# Patient Record
Sex: FEMALE | Race: WHITE | NOT HISPANIC OR LATINO | Employment: FULL TIME
[De-identification: names, ages, dates, MRNs, and addresses within clinical notes are randomized per-mention and may not be internally consistent; named-entity substitution may affect disease eponyms.]

---

## 2017-10-29 ENCOUNTER — HEALTH MAINTENANCE LETTER (OUTPATIENT)
Age: 22
End: 2017-10-29

## 2021-05-25 ENCOUNTER — RECORDS - HEALTHEAST (OUTPATIENT)
Dept: ADMINISTRATIVE | Facility: CLINIC | Age: 26
End: 2021-05-25

## 2022-11-14 ENCOUNTER — HOSPITAL ENCOUNTER (EMERGENCY)
Facility: CLINIC | Age: 27
Discharge: HOME | End: 2022-11-15
Attending: EMERGENCY MEDICINE | Admitting: EMERGENCY MEDICINE
Payer: COMMERCIAL

## 2022-11-14 DIAGNOSIS — R19.7 NAUSEA VOMITING AND DIARRHEA: ICD-10-CM

## 2022-11-14 DIAGNOSIS — R11.2 NAUSEA VOMITING AND DIARRHEA: ICD-10-CM

## 2022-11-14 LAB
ALBUMIN SERPL BCG-MCNC: 4.7 G/DL (ref 3.5–5.2)
ALBUMIN UR-MCNC: ABNORMAL MG/DL
ALP SERPL-CCNC: 60 U/L (ref 35–104)
ALT SERPL W P-5'-P-CCNC: 21 U/L (ref 10–35)
ANION GAP SERPL CALCULATED.3IONS-SCNC: 11 MMOL/L (ref 7–15)
APPEARANCE UR: CLEAR
AST SERPL W P-5'-P-CCNC: 28 U/L (ref 10–35)
BACTERIA #/AREA URNS HPF: ABNORMAL /HPF
BASOPHILS # BLD AUTO: 0 10E3/UL (ref 0–0.2)
BASOPHILS NFR BLD AUTO: 0 %
BILIRUB SERPL-MCNC: 0.7 MG/DL
BILIRUB UR QL STRIP: ABNORMAL
BUN SERPL-MCNC: 13.7 MG/DL (ref 6–20)
CALCIUM SERPL-MCNC: 9.7 MG/DL (ref 8.6–10)
CHLORIDE SERPL-SCNC: 103 MMOL/L (ref 98–107)
COLOR UR AUTO: YELLOW
CREAT SERPL-MCNC: 0.69 MG/DL (ref 0.51–0.95)
DEPRECATED HCO3 PLAS-SCNC: 25 MMOL/L (ref 22–29)
EOSINOPHIL # BLD AUTO: 0 10E3/UL (ref 0–0.7)
EOSINOPHIL NFR BLD AUTO: 1 %
ERYTHROCYTE [DISTWIDTH] IN BLOOD BY AUTOMATED COUNT: 11.8 % (ref 10–15)
GFR SERPL CREATININE-BSD FRML MDRD: >90 ML/MIN/1.73M2
GLUCOSE SERPL-MCNC: 121 MG/DL (ref 70–99)
GLUCOSE UR STRIP-MCNC: NEGATIVE MG/DL
HCG SERPL QL: NEGATIVE
HCT VFR BLD AUTO: 44.4 % (ref 35–47)
HGB BLD-MCNC: 15.1 G/DL (ref 11.7–15.7)
HGB UR QL STRIP: NEGATIVE
HOLD SPECIMEN: NORMAL
HOLD SPECIMEN: NORMAL
IMM GRANULOCYTES # BLD: 0 10E3/UL
IMM GRANULOCYTES NFR BLD: 0 %
KETONES UR STRIP-MCNC: 160 MG/DL
LEUKOCYTE ESTERASE UR QL STRIP: NEGATIVE
LIPASE SERPL-CCNC: 29 U/L (ref 13–60)
LYMPHOCYTES # BLD AUTO: 0.5 10E3/UL (ref 0.8–5.3)
LYMPHOCYTES NFR BLD AUTO: 6 %
MCH RBC QN AUTO: 30.6 PG (ref 26.5–33)
MCHC RBC AUTO-ENTMCNC: 34 G/DL (ref 31.5–36.5)
MCV RBC AUTO: 90 FL (ref 78–100)
MONOCYTES # BLD AUTO: 0.4 10E3/UL (ref 0–1.3)
MONOCYTES NFR BLD AUTO: 5 %
MUCOUS THREADS #/AREA URNS LPF: PRESENT /LPF
NEUTROPHILS # BLD AUTO: 7.6 10E3/UL (ref 1.6–8.3)
NEUTROPHILS NFR BLD AUTO: 88 %
NITRATE UR QL: NEGATIVE
NRBC # BLD AUTO: 0 10E3/UL
NRBC BLD AUTO-RTO: 0 /100
PH UR STRIP: 6 [PH] (ref 5–7)
PLATELET # BLD AUTO: 268 10E3/UL (ref 150–450)
POTASSIUM SERPL-SCNC: 4.2 MMOL/L (ref 3.4–5.3)
PROT SERPL-MCNC: 7.3 G/DL (ref 6.4–8.3)
RBC # BLD AUTO: 4.94 10E6/UL (ref 3.8–5.2)
RBC URINE: 2 /HPF
SODIUM SERPL-SCNC: 139 MMOL/L (ref 136–145)
SP GR UR STRIP: 1.03 (ref 1–1.03)
SQUAMOUS EPITHELIAL: 2 /HPF
UROBILINOGEN UR STRIP-MCNC: NORMAL MG/DL
WBC # BLD AUTO: 8.6 10E3/UL (ref 4–11)
WBC URINE: 4 /HPF

## 2022-11-14 PROCEDURE — 99284 EMERGENCY DEPT VISIT MOD MDM: CPT | Mod: 25 | Performed by: EMERGENCY MEDICINE

## 2022-11-14 PROCEDURE — 36415 COLL VENOUS BLD VENIPUNCTURE: CPT | Performed by: EMERGENCY MEDICINE

## 2022-11-14 PROCEDURE — 80053 COMPREHEN METABOLIC PANEL: CPT | Performed by: EMERGENCY MEDICINE

## 2022-11-14 PROCEDURE — 99284 EMERGENCY DEPT VISIT MOD MDM: CPT | Performed by: EMERGENCY MEDICINE

## 2022-11-14 PROCEDURE — 84703 CHORIONIC GONADOTROPIN ASSAY: CPT | Performed by: EMERGENCY MEDICINE

## 2022-11-14 PROCEDURE — 83690 ASSAY OF LIPASE: CPT | Performed by: EMERGENCY MEDICINE

## 2022-11-14 PROCEDURE — 85025 COMPLETE CBC W/AUTO DIFF WBC: CPT | Performed by: EMERGENCY MEDICINE

## 2022-11-14 PROCEDURE — 258N000003 HC RX IP 258 OP 636: Performed by: EMERGENCY MEDICINE

## 2022-11-14 PROCEDURE — 96361 HYDRATE IV INFUSION ADD-ON: CPT | Performed by: EMERGENCY MEDICINE

## 2022-11-14 PROCEDURE — 250N000011 HC RX IP 250 OP 636: Performed by: EMERGENCY MEDICINE

## 2022-11-14 PROCEDURE — 81001 URINALYSIS AUTO W/SCOPE: CPT | Performed by: EMERGENCY MEDICINE

## 2022-11-14 PROCEDURE — 96374 THER/PROPH/DIAG INJ IV PUSH: CPT | Performed by: EMERGENCY MEDICINE

## 2022-11-14 PROCEDURE — 82040 ASSAY OF SERUM ALBUMIN: CPT | Performed by: EMERGENCY MEDICINE

## 2022-11-14 RX ORDER — ONDANSETRON 2 MG/ML
4 INJECTION INTRAMUSCULAR; INTRAVENOUS ONCE
Status: COMPLETED | OUTPATIENT
Start: 2022-11-14 | End: 2022-11-14

## 2022-11-14 RX ADMIN — SODIUM CHLORIDE 1000 ML: 9 INJECTION, SOLUTION INTRAVENOUS at 20:56

## 2022-11-14 RX ADMIN — ONDANSETRON 4 MG: 2 INJECTION INTRAMUSCULAR; INTRAVENOUS at 20:59

## 2022-11-14 ASSESSMENT — ACTIVITIES OF DAILY LIVING (ADL)
ADLS_ACUITY_SCORE: 35
ADLS_ACUITY_SCORE: 35

## 2022-11-15 VITALS
SYSTOLIC BLOOD PRESSURE: 126 MMHG | HEART RATE: 105 BPM | TEMPERATURE: 99.8 F | OXYGEN SATURATION: 98 % | RESPIRATION RATE: 18 BRPM | DIASTOLIC BLOOD PRESSURE: 83 MMHG

## 2022-11-15 RX ORDER — ONDANSETRON 4 MG/1
4 TABLET, ORALLY DISINTEGRATING ORAL EVERY 8 HOURS PRN
Qty: 10 TABLET | Refills: 0 | Status: SHIPPED | OUTPATIENT
Start: 2022-11-15 | End: 2024-05-06

## 2022-11-15 ASSESSMENT — ACTIVITIES OF DAILY LIVING (ADL): ADLS_ACUITY_SCORE: 35

## 2022-11-15 NOTE — ED PROVIDER NOTES
"  History     Chief Complaint   Patient presents with     Abdominal Pain     \"Severe\" abdominal pain, unable to keep water down for 24 hours, \"violently\" shaking and patient states she is starting to become \"foggy in the head.\" Patient reports that emesis is dark brown colored.     Vomiting     HPI  Melissa Beasley is a 27 year old female with past medical history significant for anxiety disorder who presents emergency department complaining of nausea vomiting and diarrhea.  Patient states she ate a [place] this morning and had a breakfast sandwich and a iced coffee.  Patient states couple hours after this she began throwing up and has been having cramping abdominal pain throughout the day along with continued vomiting and some loose watery stools.  At times the cramping was severe that she was shaking.  She denies any fevers or chills has not had any headache or visual changes.  She denies any chest pain or shortness of breath.  Abdominal pain currently is a 3 out of 10 and was previously an 8 out of 10.  Patient says her vomit is dark but thinks it is bilious in nature.  No focal numbness weakness in extremities she has not had headache or visual changes denies any fever she was doing well until yesterday and is concerned she might have food poisoning.  Allergies:  Allergies   Allergen Reactions     Seasonal Allergies        Problem List:    Patient Active Problem List    Diagnosis Date Noted     Generalized anxiety disorder 05/26/2011     Priority: Medium     Referred for psychotherapy  Diagnosis updated by automated process. Provider to review and confirm.          Past Medical History:    Past Medical History:   Diagnosis Date     Allergies        Past Surgical History:    Past Surgical History:   Procedure Laterality Date     DENTAL SURGERY      wisdom teeth     MYRINGOTOMY, INSERT TUBE BILATERAL, COMBINED       TONSILLECTOMY         Family History:    No family history on file.    Social History:  Marital " Status:  Single [1]  Social History     Tobacco Use     Smoking status: Never     Smokeless tobacco: Never   Substance Use Topics     Alcohol use: No     Drug use: No        Medications:    norethindrone-ethinyl estradiol (GILDESS FE 1/20) 1-20 MG-MCG per tablet  sertraline (ZOLOFT) 50 MG tablet  sertraline (ZOLOFT) 50 MG tablet          Review of Systems  All systems reviewed and other than pertinent positives and negatives in HPI all other systems are negative.  Physical Exam   BP: 120/86  Pulse: (!) 133  Temp: 99.8  F (37.7  C)  Resp: 18  SpO2: 97 %      Physical Exam  Vitals and nursing note reviewed.   Constitutional:       General: She is not in acute distress.     Appearance: She is well-developed. She is not ill-appearing, toxic-appearing or diaphoretic.   HENT:      Head: Normocephalic and atraumatic.      Mouth/Throat:      Mouth: Mucous membranes are moist.      Pharynx: Oropharynx is clear.   Eyes:      Conjunctiva/sclera: Conjunctivae normal.   Cardiovascular:      Rate and Rhythm: Normal rate and regular rhythm.      Pulses: Normal pulses.      Heart sounds: Normal heart sounds. No murmur heard.  Pulmonary:      Effort: Pulmonary effort is normal.      Breath sounds: Normal breath sounds. No stridor. No wheezing or rhonchi.   Abdominal:      General: Abdomen is flat. There is no distension.      Palpations: Abdomen is soft.      Tenderness: There is no abdominal tenderness. There is no guarding or rebound.      Comments: There is hyperactive bowel sounds present.   Musculoskeletal:         General: No swelling or tenderness. Normal range of motion.      Cervical back: Normal range of motion and neck supple.      Right lower leg: No edema.      Left lower leg: No edema.   Skin:     General: Skin is warm and dry.      Capillary Refill: Capillary refill takes less than 2 seconds.      Findings: No rash.   Neurological:      General: No focal deficit present.      Mental Status: She is alert and oriented  to person, place, and time.      Sensory: No sensory deficit.      Motor: No weakness.      Coordination: Coordination normal.   Psychiatric:         Mood and Affect: Mood normal.         ED Course                 Procedures              Critical Care time:  none               Results for orders placed or performed during the hospital encounter of 11/14/22 (from the past 24 hour(s))   CBC with platelets, differential    Narrative    The following orders were created for panel order CBC with platelets, differential.  Procedure                               Abnormality         Status                     ---------                               -----------         ------                     CBC with platelets and d...[[id number]]  Abnormal            Final result                 Please view results for these tests on the individual orders.   Comprehensive metabolic panel   Result Value Ref Range    Sodium 139 136 - 145 mmol/L    Potassium 4.2 3.4 - 5.3 mmol/L    Chloride 103 98 - 107 mmol/L    Carbon Dioxide (CO2) 25 22 - 29 mmol/L    Anion Gap 11 7 - 15 mmol/L    Urea Nitrogen 13.7 6.0 - 20.0 mg/dL    Creatinine 0.69 0.51 - 0.95 mg/dL    Calcium 9.7 8.6 - 10.0 mg/dL    Glucose 121 (H) 70 - 99 mg/dL    Alkaline Phosphatase 60 35 - 104 U/L    AST 28 10 - 35 U/L    ALT 21 10 - 35 U/L    Protein Total 7.3 6.4 - 8.3 g/dL    Albumin 4.7 3.5 - 5.2 g/dL    Bilirubin Total 0.7 <=1.2 mg/dL    GFR Estimate >90 >60 mL/min/1.73m2   Lipase   Result Value Ref Range    Lipase 29 13 - 60 U/L   [facility] Draw    Narrative    The following orders were created for panel order [facility] Draw.  Procedure                               Abnormality         Status                     ---------                               -----------         ------                     Extra Blue Top Tube[[id number]]                              In process                 Extra Red Top Tube[[id number]]                               In process                   Please  view results for these tests on the individual orders.   CBC with platelets and differential   Result Value Ref Range    WBC Count 8.6 4.0 - 11.0 10e3/uL    RBC Count 4.94 3.80 - 5.20 10e6/uL    Hemoglobin 15.1 11.7 - 15.7 g/dL    Hematocrit 44.4 35.0 - 47.0 %    MCV 90 78 - 100 fL    MCH 30.6 26.5 - 33.0 pg    MCHC 34.0 31.5 - 36.5 g/dL    RDW 11.8 10.0 - 15.0 %    Platelet Count 268 150 - 450 10e3/uL    % Neutrophils 88 %    % Lymphocytes 6 %    % Monocytes 5 %    % Eosinophils 1 %    % Basophils 0 %    % Immature Granulocytes 0 %    NRBCs per 100 WBC 0 <1 /100    Absolute Neutrophils 7.6 1.6 - 8.3 10e3/uL    Absolute Lymphocytes 0.5 (L) 0.8 - 5.3 10e3/uL    Absolute Monocytes 0.4 0.0 - 1.3 10e3/uL    Absolute Eosinophils 0.0 0.0 - 0.7 10e3/uL    Absolute Basophils 0.0 0.0 - 0.2 10e3/uL    Absolute Immature Granulocytes 0.0 <=0.4 10e3/uL    Absolute NRBCs 0.0 10e3/uL       Medications   ondansetron (ZOFRAN) injection 4 mg (4 mg Intravenous Given 11/14/22 2059)   0.9% sodium chloride BOLUS (0 mLs Intravenous Stopped 11/14/22 2148)       Assessments & Plan (with Medical Decision Making) records were reviewed.  Labs were obtained.  Patient was given IV fluids and Zofran.  CBC was without significant abnormality.  Comprehensive metabolic panel with a glucose of 121.  Lipase was 29.  Pregnancy test was negative.  Urine analysis with small bilirubin 160 ketones trace protein.  Pregnancy test was negative.  Patient unable to provide sample at this time.  She was observed for some time and feeling better.  On reexamination she is not having any abdominal pain.  I discussed possible CT scan of the abdomen pelvis with patient but with no pain at this time he decided to hold off on this.  This feels like a viral gastroenteritis daughter a possible foodborne illness.  As an outpatient she will return with a stool sample for assessment for Salmonella or other possibilities.  She will continue to drink plenty of fluids she will  be given Zofran and should return if any fevers abdominal pain blood in stool focal numbness weakness any extremity or other symptoms present.  Patient is agreement this plan.     I have reviewed the nursing notes.    I have reviewed the findings, diagnosis, plan and need for follow up with the patient.       Discharge Medication List as of 11/15/2022 12:56 AM      START taking these medications    Details   ondansetron (ZOFRAN ODT) 4 MG ODT tab Take 1 tablet (4 mg) by mouth every 8 hours as needed for nausea, Disp-10 tablet, R-0, [facility]eds             Final diagnoses:   Nausea vomiting and diarrhea       11/14/2022   [facility] EMERGENCY DEPT     Kristian Palma MD  11/15/22 1447

## 2022-11-15 NOTE — ED NOTES
Patient drank half a cup of water and ate one Saltine cracker. Patient reports that she was feeling a little nauseated after eating the cracker but has been able to keep it down.

## 2022-11-15 NOTE — DISCHARGE INSTRUCTIONS
Return if symptoms worsen or new symptoms develop.  Follow-up with primary care physician next available.  Drink plenty of fluids.  Advance diet as tolerated.  Take Zofran as needed for nausea if increased fevers, return of abdominal pain or other symptoms present including blood in stool please return for recheck.  Bring in a stool sample at your convenience.

## 2023-04-23 ENCOUNTER — HEALTH MAINTENANCE LETTER (OUTPATIENT)
Age: 28
End: 2023-04-23

## 2024-03-24 ENCOUNTER — HOSPITAL ENCOUNTER (EMERGENCY)
Facility: CLINIC | Age: 29
Discharge: HOME | End: 2024-03-24
Attending: FAMILY MEDICINE | Admitting: FAMILY MEDICINE
Payer: COMMERCIAL

## 2024-03-24 ENCOUNTER — APPOINTMENT (OUTPATIENT)
Dept: CT IMAGING | Facility: CLINIC | Age: 29
End: 2024-03-24
Attending: FAMILY MEDICINE
Payer: COMMERCIAL

## 2024-03-24 VITALS
DIASTOLIC BLOOD PRESSURE: 83 MMHG | OXYGEN SATURATION: 96 % | SYSTOLIC BLOOD PRESSURE: 124 MMHG | TEMPERATURE: 97.7 F | HEART RATE: 98 BPM | RESPIRATION RATE: 10 BRPM

## 2024-03-24 DIAGNOSIS — K90.49 FOOD INTOLERANCE: ICD-10-CM

## 2024-03-24 DIAGNOSIS — R07.9 CHEST PAIN, UNSPECIFIED TYPE: ICD-10-CM

## 2024-03-24 LAB
ALBUMIN SERPL BCG-MCNC: 4.4 G/DL (ref 3.5–5.2)
ALP SERPL-CCNC: 45 U/L (ref 40–150)
ALT SERPL W P-5'-P-CCNC: 18 U/L (ref 0–50)
ANION GAP SERPL CALCULATED.3IONS-SCNC: 11 MMOL/L (ref 7–15)
AST SERPL W P-5'-P-CCNC: 13 U/L (ref 0–45)
BASOPHILS # BLD AUTO: 0 10E3/UL (ref 0–0.2)
BASOPHILS NFR BLD AUTO: 1 %
BILIRUB DIRECT SERPL-MCNC: <0.2 MG/DL (ref 0–0.3)
BILIRUB SERPL-MCNC: 0.4 MG/DL
BUN SERPL-MCNC: 9.6 MG/DL (ref 6–20)
CALCIUM SERPL-MCNC: 9.6 MG/DL (ref 8.6–10)
CHLORIDE SERPL-SCNC: 105 MMOL/L (ref 98–107)
CREAT SERPL-MCNC: 0.75 MG/DL (ref 0.51–0.95)
D DIMER PPP FEU-MCNC: 0.53 UG/ML FEU (ref 0–0.5)
DEPRECATED HCO3 PLAS-SCNC: 24 MMOL/L (ref 22–29)
EGFRCR SERPLBLD CKD-EPI 2021: >90 ML/MIN/1.73M2
EOSINOPHIL # BLD AUTO: 0.1 10E3/UL (ref 0–0.7)
EOSINOPHIL NFR BLD AUTO: 2 %
ERYTHROCYTE [DISTWIDTH] IN BLOOD BY AUTOMATED COUNT: 12.3 % (ref 10–15)
GLUCOSE SERPL-MCNC: 101 MG/DL (ref 70–99)
HCT VFR BLD AUTO: 41.1 % (ref 35–47)
HGB BLD-MCNC: 14.6 G/DL (ref 11.7–15.7)
HOLD SPECIMEN: NORMAL
HOLD SPECIMEN: NORMAL
IMM GRANULOCYTES # BLD: 0 10E3/UL
IMM GRANULOCYTES NFR BLD: 0 %
LIPASE SERPL-CCNC: 28 U/L (ref 13–60)
LYMPHOCYTES # BLD AUTO: 2.4 10E3/UL (ref 0.8–5.3)
LYMPHOCYTES NFR BLD AUTO: 43 %
MCH RBC QN AUTO: 31.7 PG (ref 26.5–33)
MCHC RBC AUTO-ENTMCNC: 35.5 G/DL (ref 31.5–36.5)
MCV RBC AUTO: 89 FL (ref 78–100)
MONOCYTES # BLD AUTO: 0.4 10E3/UL (ref 0–1.3)
MONOCYTES NFR BLD AUTO: 8 %
NEUTROPHILS # BLD AUTO: 2.6 10E3/UL (ref 1.6–8.3)
NEUTROPHILS NFR BLD AUTO: 47 %
NRBC # BLD AUTO: 0 10E3/UL
NRBC BLD AUTO-RTO: 0 /100
PLATELET # BLD AUTO: 254 10E3/UL (ref 150–450)
POTASSIUM SERPL-SCNC: 3.7 MMOL/L (ref 3.4–5.3)
PROT SERPL-MCNC: 7.4 G/DL (ref 6.4–8.3)
RBC # BLD AUTO: 4.6 10E6/UL (ref 3.8–5.2)
SODIUM SERPL-SCNC: 140 MMOL/L (ref 135–145)
TROPONIN T SERPL HS-MCNC: <6 NG/L
WBC # BLD AUTO: 5.5 10E3/UL (ref 4–11)

## 2024-03-24 PROCEDURE — 85025 COMPLETE CBC W/AUTO DIFF WBC: CPT | Performed by: FAMILY MEDICINE

## 2024-03-24 PROCEDURE — 82248 BILIRUBIN DIRECT: CPT | Performed by: FAMILY MEDICINE

## 2024-03-24 PROCEDURE — 84484 ASSAY OF TROPONIN QUANT: CPT | Performed by: FAMILY MEDICINE

## 2024-03-24 PROCEDURE — 93010 ELECTROCARDIOGRAM REPORT: CPT | Performed by: FAMILY MEDICINE

## 2024-03-24 PROCEDURE — 83690 ASSAY OF LIPASE: CPT | Performed by: FAMILY MEDICINE

## 2024-03-24 PROCEDURE — 80048 BASIC METABOLIC PNL TOTAL CA: CPT | Performed by: FAMILY MEDICINE

## 2024-03-24 PROCEDURE — 71275 CT ANGIOGRAPHY CHEST: CPT

## 2024-03-24 PROCEDURE — 93005 ELECTROCARDIOGRAM TRACING: CPT | Performed by: FAMILY MEDICINE

## 2024-03-24 PROCEDURE — 250N000011 HC RX IP 250 OP 636: Performed by: FAMILY MEDICINE

## 2024-03-24 PROCEDURE — 36415 COLL VENOUS BLD VENIPUNCTURE: CPT | Performed by: EMERGENCY MEDICINE

## 2024-03-24 PROCEDURE — 99284 EMERGENCY DEPT VISIT MOD MDM: CPT | Mod: 25 | Performed by: FAMILY MEDICINE

## 2024-03-24 PROCEDURE — 250N000009 HC RX 250: Performed by: FAMILY MEDICINE

## 2024-03-24 PROCEDURE — 85025 COMPLETE CBC W/AUTO DIFF WBC: CPT | Performed by: EMERGENCY MEDICINE

## 2024-03-24 PROCEDURE — 80053 COMPREHEN METABOLIC PANEL: CPT | Performed by: FAMILY MEDICINE

## 2024-03-24 PROCEDURE — 85379 FIBRIN DEGRADATION QUANT: CPT | Performed by: FAMILY MEDICINE

## 2024-03-24 PROCEDURE — 99285 EMERGENCY DEPT VISIT HI MDM: CPT | Mod: 25 | Performed by: FAMILY MEDICINE

## 2024-03-24 PROCEDURE — 86364 TISS TRNSGLTMNASE EA IG CLAS: CPT | Mod: 59 | Performed by: FAMILY MEDICINE

## 2024-03-24 RX ORDER — IOPAMIDOL 755 MG/ML
66 INJECTION, SOLUTION INTRAVASCULAR ONCE
Status: COMPLETED | OUTPATIENT
Start: 2024-03-24 | End: 2024-03-24

## 2024-03-24 RX ADMIN — IOPAMIDOL 66 ML: 755 INJECTION, SOLUTION INTRAVENOUS at 07:06

## 2024-03-24 RX ADMIN — SODIUM CHLORIDE 94 ML: 9 INJECTION, SOLUTION INTRAVENOUS at 07:06

## 2024-03-24 ASSESSMENT — ACTIVITIES OF DAILY LIVING (ADL)
ADLS_ACUITY_SCORE: 35

## 2024-03-24 ASSESSMENT — COLUMBIA-SUICIDE SEVERITY RATING SCALE - C-SSRS
1. IN THE PAST MONTH, HAVE YOU WISHED YOU WERE DEAD OR WISHED YOU COULD GO TO SLEEP AND NOT WAKE UP?: NO
2. HAVE YOU ACTUALLY HAD ANY THOUGHTS OF KILLING YOURSELF IN THE PAST MONTH?: NO
6. HAVE YOU EVER DONE ANYTHING, STARTED TO DO ANYTHING, OR PREPARED TO DO ANYTHING TO END YOUR LIFE?: NO

## 2024-03-24 NOTE — DISCHARGE INSTRUCTIONS
Your test results today are all reassuring for no worrisome cause for your chest pain.  This could be due to gastrointestinal issues.  Will let you know if your screening test for celiac disease is positive and if so you should follow-up in primary care or GI.  I placed a referral order to the allergist's to review whether you might have some food allergies.  Consider trying a lactose-free diet for 1 to 2 weeks to see if it changes your GI issues.
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
## 2024-03-24 NOTE — ED TRIAGE NOTES
"Left sided chest pain that started 36 hours ago. Pain radiates to the left arm and left leg. \"Random sharp pains\". Reports shortness of breath with pain. Dose not get better with position change. 3 months ago switched birth control.         "

## 2024-03-25 LAB
TTG IGA SER-ACNC: <0.2 U/ML
TTG IGG SER-ACNC: <0.6 U/ML

## 2024-04-27 ENCOUNTER — HEALTH MAINTENANCE LETTER (OUTPATIENT)
Age: 29
End: 2024-04-27

## 2024-05-06 ENCOUNTER — HOSPITAL ENCOUNTER (EMERGENCY)
Facility: CLINIC | Age: 29
Discharge: HOME | End: 2024-05-06
Attending: EMERGENCY MEDICINE | Admitting: EMERGENCY MEDICINE
Payer: COMMERCIAL

## 2024-05-06 VITALS
HEART RATE: 84 BPM | OXYGEN SATURATION: 95 % | SYSTOLIC BLOOD PRESSURE: 119 MMHG | RESPIRATION RATE: 17 BRPM | DIASTOLIC BLOOD PRESSURE: 83 MMHG | TEMPERATURE: 97.9 F

## 2024-05-06 DIAGNOSIS — R00.2 PALPITATIONS: ICD-10-CM

## 2024-05-06 DIAGNOSIS — R07.89 CHEST TIGHTNESS: ICD-10-CM

## 2024-05-06 PROBLEM — M54.6 ACUTE BILATERAL THORACIC BACK PAIN: Status: ACTIVE | Noted: 2018-01-03

## 2024-05-06 PROBLEM — S29.019A THORACIC MYOFASCIAL STRAIN: Status: ACTIVE | Noted: 2018-01-03

## 2024-05-06 LAB
ALBUMIN SERPL BCG-MCNC: 4.4 G/DL (ref 3.5–5.2)
ALBUMIN UR-MCNC: NEGATIVE MG/DL
ALP SERPL-CCNC: 72 U/L (ref 40–150)
ALT SERPL W P-5'-P-CCNC: 33 U/L (ref 0–50)
ANION GAP SERPL CALCULATED.3IONS-SCNC: 11 MMOL/L (ref 7–15)
APPEARANCE UR: CLEAR
AST SERPL W P-5'-P-CCNC: 26 U/L (ref 0–45)
BACTERIA #/AREA URNS HPF: ABNORMAL /HPF
BASOPHILS # BLD AUTO: 0.1 10E3/UL (ref 0–0.2)
BASOPHILS NFR BLD AUTO: 1 %
BILIRUB SERPL-MCNC: 0.4 MG/DL
BILIRUB UR QL STRIP: NEGATIVE
BUN SERPL-MCNC: 11.4 MG/DL (ref 6–20)
CALCIUM SERPL-MCNC: 9.5 MG/DL (ref 8.6–10)
CHLORIDE SERPL-SCNC: 104 MMOL/L (ref 98–107)
COLOR UR AUTO: ABNORMAL
CREAT SERPL-MCNC: 0.73 MG/DL (ref 0.51–0.95)
CRP SERPL-MCNC: <3 MG/L
DEPRECATED HCO3 PLAS-SCNC: 23 MMOL/L (ref 22–29)
EGFRCR SERPLBLD CKD-EPI 2021: >90 ML/MIN/1.73M2
EOSINOPHIL # BLD AUTO: 0.2 10E3/UL (ref 0–0.7)
EOSINOPHIL NFR BLD AUTO: 3 %
ERYTHROCYTE [DISTWIDTH] IN BLOOD BY AUTOMATED COUNT: 11.8 % (ref 10–15)
GLUCOSE SERPL-MCNC: 102 MG/DL (ref 70–99)
GLUCOSE UR STRIP-MCNC: NEGATIVE MG/DL
HBA1C MFR BLD: 4.7 %
HCT VFR BLD AUTO: 43.1 % (ref 35–47)
HGB BLD-MCNC: 14.9 G/DL (ref 11.7–15.7)
HGB UR QL STRIP: NEGATIVE
HOLD SPECIMEN: NORMAL
IMM GRANULOCYTES # BLD: 0 10E3/UL
IMM GRANULOCYTES NFR BLD: 0 %
KETONES UR STRIP-MCNC: NEGATIVE MG/DL
LEUKOCYTE ESTERASE UR QL STRIP: NEGATIVE
LYMPHOCYTES # BLD AUTO: 3.4 10E3/UL (ref 0.8–5.3)
LYMPHOCYTES NFR BLD AUTO: 51 %
MCH RBC QN AUTO: 31.8 PG (ref 26.5–33)
MCHC RBC AUTO-ENTMCNC: 34.6 G/DL (ref 31.5–36.5)
MCV RBC AUTO: 92 FL (ref 78–100)
MONOCYTES # BLD AUTO: 0.5 10E3/UL (ref 0–1.3)
MONOCYTES NFR BLD AUTO: 7 %
NEUTROPHILS # BLD AUTO: 2.6 10E3/UL (ref 1.6–8.3)
NEUTROPHILS NFR BLD AUTO: 38 %
NITRATE UR QL: NEGATIVE
NRBC # BLD AUTO: 0 10E3/UL
NRBC BLD AUTO-RTO: 0 /100
NT-PROBNP SERPL-MCNC: 83 PG/ML (ref 0–450)
PH UR STRIP: 7 [PH] (ref 5–7)
PLATELET # BLD AUTO: 218 10E3/UL (ref 150–450)
POTASSIUM SERPL-SCNC: 3.6 MMOL/L (ref 3.4–5.3)
PROT SERPL-MCNC: 7.2 G/DL (ref 6.4–8.3)
RBC # BLD AUTO: 4.69 10E6/UL (ref 3.8–5.2)
RBC URINE: <1 /HPF
SODIUM SERPL-SCNC: 138 MMOL/L (ref 135–145)
SP GR UR STRIP: 1 (ref 1–1.03)
SQUAMOUS EPITHELIAL: <1 /HPF
TROPONIN T SERPL HS-MCNC: <6 NG/L
TSH SERPL DL<=0.005 MIU/L-ACNC: 1.47 UIU/ML (ref 0.3–4.2)
UROBILINOGEN UR STRIP-MCNC: NORMAL MG/DL
WBC # BLD AUTO: 6.7 10E3/UL (ref 4–11)
WBC URINE: <1 /HPF

## 2024-05-06 PROCEDURE — 80053 COMPREHEN METABOLIC PANEL: CPT | Performed by: EMERGENCY MEDICINE

## 2024-05-06 PROCEDURE — 84443 ASSAY THYROID STIM HORMONE: CPT | Performed by: EMERGENCY MEDICINE

## 2024-05-06 PROCEDURE — 86140 C-REACTIVE PROTEIN: CPT | Performed by: EMERGENCY MEDICINE

## 2024-05-06 PROCEDURE — 99284 EMERGENCY DEPT VISIT MOD MDM: CPT | Mod: 25 | Performed by: EMERGENCY MEDICINE

## 2024-05-06 PROCEDURE — 99284 EMERGENCY DEPT VISIT MOD MDM: CPT | Mod: 25

## 2024-05-06 PROCEDURE — 83036 HEMOGLOBIN GLYCOSYLATED A1C: CPT | Performed by: EMERGENCY MEDICINE

## 2024-05-06 PROCEDURE — 85025 COMPLETE CBC W/AUTO DIFF WBC: CPT | Performed by: EMERGENCY MEDICINE

## 2024-05-06 PROCEDURE — 84484 ASSAY OF TROPONIN QUANT: CPT | Performed by: EMERGENCY MEDICINE

## 2024-05-06 PROCEDURE — 83880 ASSAY OF NATRIURETIC PEPTIDE: CPT | Performed by: EMERGENCY MEDICINE

## 2024-05-06 PROCEDURE — 93005 ELECTROCARDIOGRAM TRACING: CPT

## 2024-05-06 PROCEDURE — 93010 ELECTROCARDIOGRAM REPORT: CPT | Performed by: EMERGENCY MEDICINE

## 2024-05-06 PROCEDURE — 81001 URINALYSIS AUTO W/SCOPE: CPT | Performed by: EMERGENCY MEDICINE

## 2024-05-06 PROCEDURE — 36415 COLL VENOUS BLD VENIPUNCTURE: CPT | Performed by: EMERGENCY MEDICINE

## 2024-05-06 PROCEDURE — 96374 THER/PROPH/DIAG INJ IV PUSH: CPT

## 2024-05-06 PROCEDURE — 250N000011 HC RX IP 250 OP 636: Performed by: EMERGENCY MEDICINE

## 2024-05-06 RX ORDER — LORAZEPAM 2 MG/ML
0.5 INJECTION INTRAMUSCULAR ONCE
Status: COMPLETED | OUTPATIENT
Start: 2024-05-06 | End: 2024-05-06

## 2024-05-06 RX ORDER — LORAZEPAM 1 MG/1
.5-1 TABLET ORAL EVERY 6 HOURS PRN
Qty: 10 TABLET | Refills: 0 | Status: SHIPPED | OUTPATIENT
Start: 2024-05-06

## 2024-05-06 RX ADMIN — LORAZEPAM 0.5 MG: 2 INJECTION INTRAMUSCULAR; INTRAVENOUS at 02:47

## 2024-05-06 ASSESSMENT — ENCOUNTER SYMPTOMS
FATIGUE: 1
RHINORRHEA: 0
VOMITING: 0
NAUSEA: 0
ARTHRALGIAS: 0
NUMBNESS: 0
NECK PAIN: 0
SPEECH DIFFICULTY: 0
CHEST TIGHTNESS: 1
LIGHT-HEADEDNESS: 0
BACK PAIN: 0
FREQUENCY: 1
COUGH: 0
FEVER: 0
PALPITATIONS: 1
WEAKNESS: 0
CHILLS: 0
HEADACHES: 0
MYALGIAS: 0
NERVOUS/ANXIOUS: 1
SORE THROAT: 0
POLYDIPSIA: 1
SHORTNESS OF BREATH: 0
ABDOMINAL PAIN: 0
APPETITE CHANGE: 0
SEIZURES: 0
CONFUSION: 0
DIAPHORESIS: 0

## 2024-05-06 ASSESSMENT — ACTIVITIES OF DAILY LIVING (ADL)
ADLS_ACUITY_SCORE: 35
ADLS_ACUITY_SCORE: 35

## 2024-05-06 NOTE — DISCHARGE INSTRUCTIONS
Please reach out to your therapist again to schedule follow-up appointment.    Follow-up with your primary doctor May 7th as scheduled

## 2024-05-06 NOTE — ED TRIAGE NOTES
Comes in with multiple complaints. Chest pain that started multiple weeks ago, abdominal and back pain that has been worsening. Nausea with no vomiting.

## 2024-05-06 NOTE — ED PROVIDER NOTES
History     Chief Complaint   Patient presents with    Chest Pain     HPI  Melissa Mcgowan is a 28 year old female with a history of anxiety and seasonal presenting for evaluation of several weeks of intermittent chest discomfort symptoms with some tightness in her chest and upper abdomen.  Also with pains in her back and arms.  She reports sharp shooting pains down her arms and legs randomly.  Denies any nausea, diaphoresis, or difficulty breathing.  Has been concerned about family history of autoimmune disorders as well as diabetes.  Tonight she woke up with her heart racing and feeling like she could not catch her breath.  She tried to go back to sleep and did so briefly but again woke up with her heart racing and could not catch her breath.  While walking around the room, she collapsed and syncopized.   states that she slumped down to the ground and was not alert for a minute or 2.  No loss of bladder or bowel control.  No seizure activity.  No injury from the syncope.  Patient remains anxious here regarding her recurrent and progressing symptoms recently.    Allergies:  Allergies   Allergen Reactions    Seasonal Allergies        Problem List:    Patient Active Problem List    Diagnosis Date Noted    Thoracic myofascial strain 01/03/2018     Priority: Medium    Acute bilateral thoracic back pain 01/03/2018     Priority: Medium    Generalized anxiety disorder 05/26/2011     Priority: Medium     Referred for psychotherapy  Diagnosis updated by automated process. Provider to review and confirm.          Past Medical History:    Past Medical History:   Diagnosis Date    Allergies        Past Surgical History:    Past Surgical History:   Procedure Laterality Date    DENTAL SURGERY      wisdom teeth    MYRINGOTOMY, INSERT TUBE BILATERAL, COMBINED      TONSILLECTOMY         Family History:    No family history on file.    Social History:  Marital Status:   [2]  Social History     Tobacco Use     Smoking status: Never    Smokeless tobacco: Never   Substance Use Topics    Alcohol use: No    Drug use: No        Medications:    LORazepam (ATIVAN) 1 MG tablet          Review of Systems   Constitutional:  Positive for fatigue. Negative for appetite change, chills, diaphoresis and fever.   HENT:  Negative for congestion, rhinorrhea and sore throat.    Eyes:  Negative for visual disturbance.   Respiratory:  Positive for chest tightness. Negative for cough and shortness of breath.    Cardiovascular:  Positive for palpitations. Negative for chest pain and leg swelling.   Gastrointestinal:  Negative for abdominal pain, nausea and vomiting.   Endocrine: Positive for polydipsia and polyuria.   Genitourinary:  Positive for frequency. Negative for decreased urine volume, vaginal bleeding and vaginal discharge.   Musculoskeletal:  Negative for arthralgias, back pain, myalgias and neck pain.   Skin:  Negative for rash.   Neurological:  Positive for syncope. Negative for seizures, speech difficulty, weakness, light-headedness, numbness and headaches.        Intermittent shooting pains down all extremities   Psychiatric/Behavioral:  Negative for confusion. The patient is nervous/anxious.    All other systems reviewed and are negative.      Physical Exam   BP: (!) 153/121  Pulse: 108  Temp: 97.9  F (36.6  C)  Resp: 25  SpO2: 99 %      Physical Exam  Vitals and nursing note reviewed.   Constitutional:       Appearance: She is well-developed. She is not ill-appearing or diaphoretic.      Comments: Awake and alert, anxious appearing and tearful.  Able to speak in full sentences and provide a full history.   at bedside provides some additional limited history   HENT:      Head: Normocephalic and atraumatic.   Eyes:      Pupils: Pupils are equal, round, and reactive to light.   Cardiovascular:      Rate and Rhythm: Regular rhythm. Tachycardia present.      Heart sounds: Normal heart sounds. No murmur heard.     Comments:  Sinus rhythm with a heart rate between 90 and 105  Pulmonary:      Effort: Pulmonary effort is normal.      Breath sounds: Normal breath sounds.   Chest:      Chest wall: No tenderness.   Abdominal:      Palpations: Abdomen is soft.      Tenderness: There is no abdominal tenderness.   Musculoskeletal:      Cervical back: Normal range of motion.      Right lower leg: No tenderness. No edema.      Left lower leg: No tenderness. No edema.   Skin:     General: Skin is warm and dry.      Capillary Refill: Capillary refill takes less than 2 seconds.   Neurological:      Mental Status: She is alert and oriented to person, place, and time.      Cranial Nerves: No cranial nerve deficit.      Motor: No weakness.   Psychiatric:         Mood and Affect: Mood is anxious.         ED Course        Procedures              EKG Interpretation:      Interpreted by Gus Vallejo MD  Time reviewed: 0215  Symptoms at time of EKG: chest pains   Rhythm: sinus tachycardis  Rate: 106  Axis: normal  Ectopy: none  Conduction: normal  ST Segments/ T Waves: Some inferior lateral ST depressions present  Q Waves: none  Comparison to prior: ST depressions slightly changed compared to previous EKG 3/24/2024    Clinical Impression: Sinus tachycardia with some nonspecific ST changes               Results for orders placed or performed during the hospital encounter of 05/06/24 (from the past 24 hour(s))   [place] Draw    Narrative    The following orders were created for panel order [place] Draw.  Procedure                               Abnormality         Status                     ---------                               -----------         ------                     Extra Red Top Tube[[id number]]                               Final result               Extra Green Top (Lithium...[[id number]]                      Final result               Extra Purple Top Tube[[id number]]                            Final result                 Please view  results for these tests on the individual orders.   Extra Red Top Tube   Result Value Ref Range    Hold Specimen JIC    Extra Green Top (Lithium Heparin) Tube   Result Value Ref Range    Hold Specimen JIC    Extra Purple Top Tube   Result Value Ref Range    Hold Specimen     CBC with platelets, differential    Narrative    The following orders were created for panel order CBC with platelets, differential.  Procedure                               Abnormality         Status                     ---------                               -----------         ------                     CBC with platelets and d...[[id number]]                      Final result                 Please view results for these tests on the individual orders.   Comprehensive metabolic panel   Result Value Ref Range    Sodium 138 135 - 145 mmol/L    Potassium 3.6 3.4 - 5.3 mmol/L    Carbon Dioxide (CO2) 23 22 - 29 mmol/L    Anion Gap 11 7 - 15 mmol/L    Urea Nitrogen 11.4 6.0 - 20.0 mg/dL    Creatinine 0.73 0.51 - 0.95 mg/dL    GFR Estimate >90 >60 mL/min/1.73m2    Calcium 9.5 8.6 - 10.0 mg/dL    Chloride 104 98 - 107 mmol/L    Glucose 102 (H) 70 - 99 mg/dL    Alkaline Phosphatase 72 40 - 150 U/L    AST 26 0 - 45 U/L    ALT 33 0 - 50 U/L    Protein Total 7.2 6.4 - 8.3 g/dL    Albumin 4.4 3.5 - 5.2 g/dL    Bilirubin Total 0.4 <=1.2 mg/dL   Troponin T, High Sensitivity   Result Value Ref Range    Troponin T, High Sensitivity <6 <=14 ng/L   CBC with platelets and differential   Result Value Ref Range    WBC Count 6.7 4.0 - 11.0 10e3/uL    RBC Count 4.69 3.80 - 5.20 10e6/uL    Hemoglobin 14.9 11.7 - 15.7 g/dL    Hematocrit 43.1 35.0 - 47.0 %    MCV 92 78 - 100 fL    MCH 31.8 26.5 - 33.0 pg    MCHC 34.6 31.5 - 36.5 g/dL    RDW 11.8 10.0 - 15.0 %    Platelet Count 218 150 - 450 10e3/uL    % Neutrophils 38 %    % Lymphocytes 51 %    % Monocytes 7 %    % Eosinophils 3 %    % Basophils 1 %    % Immature Granulocytes 0 %    NRBCs per 100 WBC 0 <1 /100     Absolute Neutrophils 2.6 1.6 - 8.3 10e3/uL    Absolute Lymphocytes 3.4 0.8 - 5.3 10e3/uL    Absolute Monocytes 0.5 0.0 - 1.3 10e3/uL    Absolute Eosinophils 0.2 0.0 - 0.7 10e3/uL    Absolute Basophils 0.1 0.0 - 0.2 10e3/uL    Absolute Immature Granulocytes 0.0 <=0.4 10e3/uL    Absolute NRBCs 0.0 10e3/uL   TSH with free T4 reflex   Result Value Ref Range    TSH 1.47 0.30 - 4.20 uIU/mL   Nt probnp inpatient (BNP)   Result Value Ref Range    N terminal Pro BNP Inpatient 83 0 - 450 pg/mL   CRP inflammation   Result Value Ref Range    CRP Inflammation <3.00 <5.00 mg/L   Hemoglobin A1c   Result Value Ref Range    Hemoglobin A1C 4.7 <5.7 %   UA with Microscopic reflex to Culture    Specimen: Urine, Clean Catch   Result Value Ref Range    Color Urine Straw Colorless, Straw, Light Yellow, Yellow    Appearance Urine Clear Clear    Glucose Urine Negative Negative mg/dL    Bilirubin Urine Negative Negative    Ketones Urine Negative Negative mg/dL    Specific Gravity Urine 1.005 1.003 - 1.035    Blood Urine Negative Negative    pH Urine 7.0 5.0 - 7.0    Protein Albumin Urine Negative Negative mg/dL    Urobilinogen Urine Normal Normal, 2.0 mg/dL    Nitrite Urine Negative Negative    Leukocyte Esterase Urine Negative Negative    Bacteria Urine Moderate (A) None Seen /HPF    RBC Urine <1 <=2 /HPF    WBC Urine <1 <=5 /HPF    Squamous Epithelials Urine <1 <=1 /HPF    Narrative    Urine Culture not indicated       Medications   LORazepam (ATIVAN) injection 0.5 mg (0.5 mg Intravenous $Given 5/6/24 0247)     3:32 AM Patient re-assessed: Patient feeling much better.  Had discussion with patient and spouse regarding reassuring workup.  Discussed consideration for anxiety as a contributing cause to her symptoms.  Patient does report that she has been worried about her anxiety and recognizing that her current stress has been worse and she has been wondering if this is contributing to her symptoms.  Discussed some treatment options for the  patient.  She has follow-up already scheduled with primary care.  Encouraged follow-up with her therapist and exercise to help treat her anxiety.    Assessments & Plan (with Medical Decision Making)  28-year-old female with history of anxiety presenting for evaluation of intermittent chest discomfort with chest tightness and upper abdominal discomfort as well as intermittent pains in her arms and legs.  Well-appearing but anxious and tearful in the ED.  Screening workup performed including EKG, troponin, TSH, CRP, troponin, BNP.  Urinalysis also obtained.  All workup very reassuring with no evidence of a dangerous cause of her symptoms.  No evidence of diabetes or neuropathy and she has no neurologic symptoms currently.  No UTI.  No evidence of heart dysfunction.  Counseled patient on reassuring workup.  Discussed consideration for anxiety symptoms to be causing her physical manifestations of discomfort.  Recommended following up with her therapist as well as her primary care provider.  Discussed the benefits of exercise to help with anxiety.  Discussed medication management options.  She did have relief with lorazepam here.  Patient open to a trial of as needed Ativan which was written for her to help with anxiety.  Discharged home in improved condition with plan for close primary care follow-up.     I have reviewed the nursing notes.    I have reviewed the findings, diagnosis, plan and need for follow up with the patient.               Discharge Medication List as of 5/6/2024  3:51 AM        START taking these medications    Details   LORazepam (ATIVAN) 1 MG tablet Take 0.5-1 tablets (0.5-1 mg) by mouth every 6 hours as needed for anxiety, Disp-10 tablet, R-0, E-Prescribe             Final diagnoses:   Palpitations   Chest tightness       5/6/2024   [facility] EMERGENCY DEPT       Vallejo, Gus Chandler MD  05/06/24 [id number]

## 2024-05-07 ENCOUNTER — OFFICE VISIT (OUTPATIENT)
Dept: FAMILY MEDICINE | Facility: CLINIC | Age: 29
End: 2024-05-07
Payer: COMMERCIAL

## 2024-05-07 ENCOUNTER — ORDERS ONLY (AUTO-RELEASED) (OUTPATIENT)
Dept: FAMILY MEDICINE | Facility: CLINIC | Age: 29
End: 2024-05-07

## 2024-05-07 VITALS
SYSTOLIC BLOOD PRESSURE: 119 MMHG | HEART RATE: 92 BPM | BODY MASS INDEX: 24.51 KG/M2 | WEIGHT: 156.13 LBS | DIASTOLIC BLOOD PRESSURE: 87 MMHG | HEIGHT: 67 IN | TEMPERATURE: 98.4 F | OXYGEN SATURATION: 97 % | RESPIRATION RATE: 12 BRPM

## 2024-05-07 DIAGNOSIS — M79.2 NEUROPATHIC PAIN: ICD-10-CM

## 2024-05-07 DIAGNOSIS — R55 SYNCOPE, UNSPECIFIED SYNCOPE TYPE: ICD-10-CM

## 2024-05-07 DIAGNOSIS — R00.2 PALPITATIONS: ICD-10-CM

## 2024-05-07 DIAGNOSIS — F41.1 GENERALIZED ANXIETY DISORDER: ICD-10-CM

## 2024-05-07 DIAGNOSIS — M54.2 PAIN OF CERVICAL SPINE: Primary | ICD-10-CM

## 2024-05-07 DIAGNOSIS — N28.89 DILATED RENAL PELVIS: ICD-10-CM

## 2024-05-07 PROCEDURE — 96127 BRIEF EMOTIONAL/BEHAV ASSMT: CPT

## 2024-05-07 PROCEDURE — 99214 OFFICE O/P EST MOD 30 MIN: CPT

## 2024-05-07 PROCEDURE — G2211 COMPLEX E/M VISIT ADD ON: HCPCS

## 2024-05-07 RX ORDER — ESCITALOPRAM OXALATE 10 MG/1
10 TABLET ORAL DAILY
Qty: 90 TABLET | Refills: 3 | Status: SHIPPED | OUTPATIENT
Start: 2024-05-07

## 2024-05-07 ASSESSMENT — ANXIETY QUESTIONNAIRES
7. FEELING AFRAID AS IF SOMETHING AWFUL MIGHT HAPPEN: MORE THAN HALF THE DAYS
3. WORRYING TOO MUCH ABOUT DIFFERENT THINGS: MORE THAN HALF THE DAYS
GAD7 TOTAL SCORE: 14
GAD7 TOTAL SCORE: 14
5. BEING SO RESTLESS THAT IT IS HARD TO SIT STILL: SEVERAL DAYS
7. FEELING AFRAID AS IF SOMETHING AWFUL MIGHT HAPPEN: MORE THAN HALF THE DAYS
IF YOU CHECKED OFF ANY PROBLEMS ON THIS QUESTIONNAIRE, HOW DIFFICULT HAVE THESE PROBLEMS MADE IT FOR YOU TO DO YOUR WORK, TAKE CARE OF THINGS AT HOME, OR GET ALONG WITH OTHER PEOPLE: VERY DIFFICULT
6. BECOMING EASILY ANNOYED OR IRRITABLE: SEVERAL DAYS
1. FEELING NERVOUS, ANXIOUS, OR ON EDGE: NEARLY EVERY DAY
2. NOT BEING ABLE TO STOP OR CONTROL WORRYING: NEARLY EVERY DAY
4. TROUBLE RELAXING: MORE THAN HALF THE DAYS
8. IF YOU CHECKED OFF ANY PROBLEMS, HOW DIFFICULT HAVE THESE MADE IT FOR YOU TO DO YOUR WORK, TAKE CARE OF THINGS AT HOME, OR GET ALONG WITH OTHER PEOPLE?: VERY DIFFICULT

## 2024-05-07 ASSESSMENT — PATIENT HEALTH QUESTIONNAIRE - PHQ9
10. IF YOU CHECKED OFF ANY PROBLEMS, HOW DIFFICULT HAVE THESE PROBLEMS MADE IT FOR YOU TO DO YOUR WORK, TAKE CARE OF THINGS AT HOME, OR GET ALONG WITH OTHER PEOPLE: VERY DIFFICULT
SUM OF ALL RESPONSES TO PHQ QUESTIONS 1-9: 8
SUM OF ALL RESPONSES TO PHQ QUESTIONS 1-9: 8

## 2024-05-07 NOTE — ASSESSMENT & PLAN NOTE
Patient presents today to discuss a constellation of physical symptoms, including sharp and shooting chest pains, palpitations, dyspnea.  She also notes neck pain and some radicular pain to the left leg she complains of almost all over body pain, that is present during the day and night and has almost a neuropathic like quality to it.  She also had a quite significant episode of syncope, during which she actually lost consciousness for a few minutes per her . She has been seen twice in the emergency room setting, both with extremely reassuring workups.  She has had wide ranging blood work completed D-dimers, CMP, CBC, thyroid, hemoglobin A1c, BNP, troponins and digestive enzymes all of which returned normal.  She had a  chest CT without significant findings as well as ECGs.  Provided reassurance, and that it seems as if we have ruled out a lot of concerning etiologies of her symptoms. She has a strong history of anxiety medications and notes that she was medicated for a number of years up until her mid 20's. She repeatedly references her anxiety as it pertains to physical symptoms, and we discussed that although I do not wish to discount her physical symptoms (and plan to workup as documented), I am strongly suspicious of anxiety playing a key role in an otherwise healthy 28 year old with such significant physical symptoms. She does agree with this. While workup is being completed and referrals are made, in the interim I would strongly recommend initiation of an selective serotonin reuptake inhibitor and she is amenable to this. Does not want to resume sertraline; will trial escitalopram. Expected therapeutic effects and potential side effects discussed. She can use breakthrough medication that was prescribed if needed - she does note that she slept better with this. Continue talk therapy. I encouraged her to schedule a follow up with myself in 4-6 weeks; she does need to establish with a local provider but  we can plan to adjust in the interim virtually. Patient expressed understanding of and agreement with this plan. All questions were answered.

## 2024-05-07 NOTE — COMMUNITY RESOURCES LIST (ENGLISH)
May 7, 2024           YOUR PERSONALIZED LIST OF SERVICES & PROGRAMS               Bill Payment Assistance      [employer] [facility] - Energy Assistance  [address] [place] [address] (Distance: 14.7 miles)  Phone: [phone]  Language: English, Ukrainian, Hmong, Senegalese  Fee: Free  Accessibility: Translation services      [facility] - Utility payment assistance  [address] [place] [address] (Distance: 4.3 miles)  Phone: [phone]  Language: English  Fee: Free  Accessibility: Translation services      - Dislocated Worker/Adult [employer] Employment Program  Phone: ([phone]) 148-2402  Email: elle@[employer]  Website: https://[employer]/services/employment-services/dislocated-worker-program/  Language: English, Senegalese  Hours: Mon 8:00 AM - 4:30 PM Tue 8:00 AM - 4:30 PM Wed 8:00 AM - 4:30 PM Thu 8:00 AM - 4:30 PM Fri 8:00 AM - 4:30 PM  Fee: Free  Accessibility: [place] accessible               IMPORTANT NUMBERS & WEBSITES        Emergency Services  911  .   United Way  211 http://211unitedway.org  .   Poison Control  (621) 360-4911 http://[place]poison.org http://[place]poison.org  .     Suicide and Crisis Lifeline  988 http://988lifeline.org  .   Childhelp National Child Abuse Hotline  979.271.2207 http://Childhelphotline.org   .   National Sexual Assault Hotline  (883) 354-5803 (HOPE) http://Rainn.org   .     National Runaway Safeline  (290) 718-3996 (RUNAWAY) http://1800runaway.org  .   Pregnancy & Postpartum Support  Call/text 900-179-3342  [place]: http://ppsupportmn.org  [place]: http://[place].com/wi  .   Substance Abuse National Helpline ([facility])  245-547-HELP (2469) http://Findtreatment.gov   .                DISCLAIMER: These resources have been generated via the [employer] Platform. [employer] does not endorse any service providers mentioned in this resource list. [employer] does not guarantee that the services mentioned in this resource list will  be available to you or will improve your health or wellness.    [facility]

## 2024-05-07 NOTE — ASSESSMENT & PLAN NOTE
Discussed with patient that I do not have a clear etiology of the odd pain symptoms that she is describing.  They are bilateral, and she does complain of neck and lower back pain, so stenosis remains on the differential list.  She has no alarm findings such as saddle anesthesia, loss of control of bowel or bladder, neurologic weakness which is all reassuring.  Anxiety as documented elsewhere.  She has had labs completed such as thyroid testing and hemoglobin A1c which were all unrevealing.  We discussed the possibility of some atypical neuropathic pain syndrome, fibromyalgia or complex regional pain syndrome and the diagnostic challenges associated with this.  Given concurrent paresthesias and all over distribution, a neurology consultation seems reasonable and patient would like this.  We also discussed consulting with pain management for a comprehensive pain evaluation, but she would like to wait on this.  I also think controlling anxiety as document also will be integral to control of symptoms; but if anxiety is better controlled and pain persist without clear cause could consider the addition of a neuropathic medication such as Lyrica or gabapentin.

## 2024-05-07 NOTE — ASSESSMENT & PLAN NOTE
Follow up to recent ER visit - patient had an observed episode of syncope without head strike but with extended loss of consciousness. She had initial workup completed which was reassuring. Concurrent complaints of chest pain and palpitations. Discussed I think we have very reassuring data thus far, however a ZioPatch seems more than appropriate given these complaints as she does note her heart monitor will oftentimes register sudden spikes in heart rate to >120. Order placed and I will plan to follow up on results.

## 2024-05-07 NOTE — ASSESSMENT & PLAN NOTE
Mild dilation of the right renal pelvis was noted on CT of the chest that was done as part of her PE rule out. She declined CT of the abdomen/pelvis at that time, however today she is interested in this. She notes some sensation of abdominal 'inflammation' as well as right lower quadrant pain, therefore I think it's reasonable to pursue just to rule out something such as obstruction or hydronephrosis as well as get a better look at the abdomen as a whole. Order placed.

## 2024-05-07 NOTE — ASSESSMENT & PLAN NOTE
Patient presents today with a constellation of symptoms as documented elsewhere, but does complain of cervical pain has been present for many months.  She has been seen in clinic historically per her report and prescribed Medrol Dosepak without significant relief of symptoms.  She has had an x-ray completed, although this was outside at a chiropractor's office, which she reports showed normal joint spacing but some early degenerative disease and an abnormal curvature.  She is complaining of bilateral upper extremity paresthesias and radicular pain without diminished reflexes or strength. A more comprehensive physical assessment was unable to be completed today simply due to time restraints, but it doesn't appear she has any alarm findings. Discussed that I think given the radicular nature of symptoms, although my suspicion for something such as a severe nerve impingement in the absence of a known injury is quite low, an MRI imaging could be justified. I will have her consult with spine as she has a provider close to her home and this referral was placed today.

## 2024-05-07 NOTE — PROGRESS NOTES
Assessment & Plan   Problem List Items Addressed This Visit       Generalized anxiety disorder     Patient presents today to discuss a constellation of physical symptoms, including sharp and shooting chest pains, palpitations, dyspnea.  She also notes neck pain and some radicular pain to the left leg she complains of almost all over body pain, that is present during the day and night and has almost a neuropathic like quality to it.  She also had a quite significant episode of syncope, during which she actually lost consciousness for a few minutes per her . She has been seen twice in the emergency room setting, both with extremely reassuring workups.  She has had wide ranging blood work completed D-dimers, CMP, CBC, thyroid, hemoglobin A1c, BNP, troponins and digestive enzymes all of which returned normal.  She had a  chest CT without significant findings as well as ECGs.  Provided reassurance, and that it seems as if we have ruled out a lot of concerning etiologies of her symptoms. She has a strong history of anxiety medications and notes that she was medicated for a number of years up until her mid 20's. She repeatedly references her anxiety as it pertains to physical symptoms, and we discussed that although I do not wish to discount her physical symptoms (and plan to workup as documented), I am strongly suspicious of anxiety playing a key role in an otherwise healthy 28 year old with such significant physical symptoms. She does agree with this. While workup is being completed and referrals are made, in the interim I would strongly recommend initiation of an selective serotonin reuptake inhibitor and she is amenable to this. Does not want to resume sertraline; will trial escitalopram. Expected therapeutic effects and potential side effects discussed. She can use breakthrough medication that was prescribed if needed - she does note that she slept better with this. Continue talk therapy. I encouraged her to  January 7, 2020       Terry Calle MD  [address] [place] [address]  VIA Facsimile: [phone]      Patient: Lore Sylvester   YOB: 1932   Date of Visit: 1/7/2020       Dear Dr. Calle:    Thank you for referring Lore Sylvester to me for evaluation. Below are my notes for this visit with her.    If you have questions, please do not hesitate to call me. I look forward to following your patient along with you.      Sincerely,        Ele Schwarz DO        CC: No Recipients  Ele Schwarz DO  1/7/2020 12:00 PM  Sign when Signing Visit  Cardiology Visit    HPI:  This is a 87 year old female. She has a history of coronary artery bypass graft surgery February 12, 2003 saphenous vein graft to the LAD saphenous vein graft to the OM and saphenous vein graft to the PDA.  She was having chest discomfort at that time with vacuuming that led to the cardiac catheterization.  She has labile blood pressures had problems tolerating the metoprolol and the carvedilol secondary to diarrhea.  She has had chest tightness that occurs when she is sleeping.  She does have prerenal azotemia and dehydration and has been told that she needs to drink more free water.  She exercises on the treadmill 15 to 30 minutes a day about 4-5 times a week without any chest pain, pressure, neck, jaw or arm discomfort.  Blood pressures have been quite high most recently.. Presents to the office for routine follow-up    Past Medical History:   Diagnosis Date   • CAD (coronary artery disease)    • Chronic kidney disease, stage 3 (CMS/HCC)    • Dysthymia    • Essential hypertension, benign    • Lipids abnormal    • Schatzki's ring of distal esophagus        Past Surgical History:   Procedure Laterality Date   • Appendectomy     • Colon surgery     • Coronary artery bypass graft     • Tonsillectomy and adenoidectomy         MEDICATIONS  Outpatient Encounter Medications as of 1/7/2020   Medication Sig Dispense Refill   • tiZANidine  (ZANAFLEX) 2 MG tablet Take 1 tablet by mouth every 8 hours as needed for Muscle spasms. 30 tablet 0   • esomeprazole (NEXIUM) 40 MG packet Take 40 mg by mouth.     • rosuvastatin (CRESTOR) 20 MG tablet   2   • aspirin 81 MG EC tablet Take 81 mg by mouth daily.     • Apoaequorin (PREVAGEN PO)      • amLODIPine (NORVASC) 2.5 MG tablet Take 1 tablet by mouth daily. 90 tablet 3     No facility-administered encounter medications on file as of 1/7/2020.        ALLERGIES  Allergies as of 01/07/2020   • (No Known Allergies)       Social History     Socioeconomic History   • Marital status:      Spouse name: Not on file   • Number of children: Not on file   • Years of education: Not on file   • Highest education level: Not on file   Occupational History   • Not on file   Social Needs   • Financial resource strain: Not on file   • Food insecurity:     Worry: Not on file     Inability: Not on file   • Transportation needs:     Medical: Not on file     Non-medical: Not on file   Tobacco Use   • Smoking status: Never Smoker   • Smokeless tobacco: Never Used   Substance and Sexual Activity   • Alcohol use: Not Currently   • Drug use: Never   • Sexual activity: Not on file   Lifestyle   • Physical activity:     Days per week: Not on file     Minutes per session: Not on file   • Stress: Not on file   Relationships   • Social connections:     Talks on phone: Not on file     Gets together: Not on file     Attends Orthodox service: Not on file     Active member of club or organization: Not on file     Attends meetings of clubs or organizations: Not on file     Relationship status: Not on file   • Intimate partner violence:     Fear of current or ex partner: Not on file     Emotionally abused: Not on file     Physically abused: Not on file     Forced sexual activity: Not on file   Other Topics Concern   • Not on file   Social History Narrative   • Not on file       Family History   Problem Relation Age of Onset   • Cancer  schedule a follow up with myself in 4-6 weeks; she does need to establish with a local provider but we can plan to adjust in the interim virtually. Patient expressed understanding of and agreement with this plan. All questions were answered.         Relevant Medications    escitalopram (LEXAPRO) 10 MG tablet    Pain of cervical spine - Primary     Patient presents today with a constellation of symptoms as documented elsewhere, but does complain of cervical pain has been present for many months.  She has been seen in clinic historically per her report and prescribed Medrol Dosepak without significant relief of symptoms.  She has had an x-ray completed, although this was outside at a chiropractor's office, which she reports showed normal joint spacing but some early degenerative disease and an abnormal curvature.  She is complaining of bilateral upper extremity paresthesias and radicular pain without diminished reflexes or strength. A more comprehensive physical assessment was unable to be completed today simply due to time restraints, but it doesn't appear she has any alarm findings. Discussed that I think given the radicular nature of symptoms, although my suspicion for something such as a severe nerve impingement in the absence of a known injury is quite low, an MRI imaging could be justified. I will have her consult with spine as she has a provider close to her home and this referral was placed today.          Relevant Orders    Spine  Referral    Palpitations    Relevant Orders    ZIO PATCH MAIL OUT    Syncope, unspecified syncope type     Follow up to recent ER visit - patient had an observed episode of syncope without head strike but with extended loss of consciousness. She had initial workup completed which was reassuring. Concurrent complaints of chest pain and palpitations. Discussed I think we have very reassuring data thus far, however a ZioPatch seems more than appropriate given these complaints  Other        REVIEW OF SYSTEMS:  All other pertinent review of system is otherwise negative except as those noted in HPI.    Vitals: Blood pressure (!) 180/100, pulse 68, height 5' (1.524 m), weight 50.4 kg (111 lb 3.2 oz).    Physical Exam  The patient appeared well nourished and normally developed. Vital signs as documented.  Head: Exam is unremarkable.   Eyes: No scleral icterus or corneal arcus noted.  Neck: is without jugular venous distension, or carotid bruits. Carotid upstrokes are brisk bilaterally.  Lungs: are clear to auscultation and percussion.  Cardiac: rrr no s3 + EKG: S4  Abdominal: Exam reveals normal bowel sounds, no masses, no organomegaly and no aortic enlargement.   Extremities: no edema and both dorsalis and pedal pulses are normal.    LABORATORY  I have reviewed the pertinent laboratory tests. These are the pertinent findings:    Office Visit on 11/29/2019   Component Date Value Ref Range Status   • POCT Color 11/29/2019 Yellow   Final   • POCT Appearance 11/29/2019 Clear   Final   • POCT Glucose Urine 11/29/2019 Negative  Negative Final   • POCT Bilirubin 11/29/2019 Negative  Negative Final   • POCT Ketones 11/29/2019 Negative  Negative Final   • POCT Specific Gravity 11/29/2019 1.020  1.005 - 1.03 Final   • POCT Occult Blood 11/29/2019 Negative  Negative Final   • POCT pH 11/29/2019 5.5  5 - 7 Final   • POCT Protein 11/29/2019 30 mg/dL  Negative Final   • POCT Urobilinogen 11/29/2019 0.2  0 - 1 mg/dL Final   • Urine Nitrite 11/29/2019 Negative  Negative Final   • WBC (Leukocyte) Esterase POC 11/29/2019 Small  Negative Final   • Specimen Description 11/29/2019 URINE, CLEAN CATCH/MIDSTREAM   Final   • CULTURE 11/29/2019 10,000 TO 50,000 CFU/mL MIXED BACTERIAL GIANLUCA WITH NO PREDOMINATING TYPE   Final   • REPORT STATUS 11/29/2019 12/01/2019 FINAL   Final       IMAGING:     EKG: Normal sinus rhythm with sinus arrhythmia at a rate of 68 bpm    ASSESSMENT/PLAN  Dyslipidemia  - ELECTROCARDIOGRAM  as she does note her heart monitor will oftentimes register sudden spikes in heart rate to >120. Order placed and I will plan to follow up on results.         Relevant Orders    WARNER CH MAIL OUT    Dilated renal pelvis     Mild dilation of the right renal pelvis was noted on CT of the chest that was done as part of her PE rule out. She declined CT of the abdomen/pelvis at that time, however today she is interested in this. She notes some sensation of abdominal 'inflammation' as well as right lower quadrant pain, therefore I think it's reasonable to pursue just to rule out something such as obstruction or hydronephrosis as well as get a better look at the abdomen as a whole. Order placed.         Relevant Orders    CT Abdomen Pelvis w/o Contrast    Neuropathic pain     Discussed with patient that I do not have a clear etiology of the odd pain symptoms that she is describing.  They are bilateral, and she does complain of neck and lower back pain, so stenosis remains on the differential list.  She has no alarm findings such as saddle anesthesia, loss of control of bowel or bladder, neurologic weakness which is all reassuring.  Anxiety as documented elsewhere.  She has had labs completed such as thyroid testing and hemoglobin A1c which were all unrevealing.  We discussed the possibility of some atypical neuropathic pain syndrome, fibromyalgia or complex regional pain syndrome and the diagnostic challenges associated with this.  Given concurrent paresthesias and all over distribution, a neurology consultation seems reasonable and patient would like this.  We also discussed consulting with pain management for a comprehensive pain evaluation, but she would like to wait on this.  I also think controlling anxiety as document also will be integral to control of symptoms; but if anxiety is better controlled and pain persist without clear cause could consider the addition of a neuropathic medication such as Lyrica or  12-LEAD    Essential hypertension  - ELECTROCARDIOGRAM 12-LEAD    Hx of CABG  - ELECTROCARDIOGRAM 12-LEAD    Coronary artery disease involving native coronary artery of native heart with unstable angina pectoris (CMS/HCC)    Chest pain, unspecified type    Orders Placed This Encounter   • Electrocardiogram 12-Lead   • amLODIPine (NORVASC) 2.5 MG tablet       SUMMARY:  Lore is having uncontrolled hypertension.  She was having swallowing problems and dry mouth and had an EGD demonstrating just dry esophagus.  Recommendation was increasing her free water.  I have initiated her on amlodipine 2.5 mg a day for uncontrolled hypertension understanding that she is labile nature.  She had undergone an echocardiogram on 9/13/2018 demonstrating normal systolic function without any significant valvular heart disease just mild mitral regurgitation mild tricuspid regurgitation.  Lexiscan Cardiolite on 9/13/2018 demonstrated normal perfusion with an ejection fraction of greater than 70%.    Return in about 6 months (around 7/7/2020).  Ele Schwarz, DO  1/7/2020      Disclaimer Note: Your provider prepared this document using voice recognition technology.  In that case, if a word or phrase is confusing, or does not make sense, this is likely due to a recognition error within the program which was not discovered during the provider's review.  If you believe an error has occurred, please notify your provider's office at your earliest convenience, so we can correct any mistakes.            gabapentin.         Relevant Orders    Adult Neurology  Referral       MED REC REQUIRED  Post Medication Reconciliation Status:  Patient was not discharged from an inpatient facility or TCU    Subjective   Melissa is a 28 year old, presenting for the following health issues:  Follow Up ([place] [facility] 03/24/24 and 05/06/24 Chest pains and sharp pains-shooting all over body. Did food allergy testing and cut out foods in diet. Stopped OCP. Awakes at HS gasping for air. Sciatica pain pelvic area twisted. /Fainted Sunday PM 05/05/24 and went to [facility] ER.)        5/7/2024    11:00 AM   Additional Questions   Roomed by sac   Accompanied by self.         5/7/2024    11:00 AM   Patient Reported Additional Medications   Patient reports taking the following new medications no     Patient presents today in follow up. She has had two Emergency Room visits in the last two months with complaints of sharp and shooting chest pains with palpitations that is oftentimes happening in the middle of the night. When this happens, she feels as if she can't breathe. Yesterday, she was seen in the Emergency Room with complaints of syncope and LOC for a few minutes per her . She notes all over pain, shooting in both of her arms and both of her legs. This happens during the day and at night. It feels like a nerve - is shooting and burning. She has essentially had a completely benign workup thusfar and has no explanation for symptoms, which prompts her visit.    Also complains of neck pain and some shooting pain into the left leg.  She notes that she has been seen for her neck pain and prescribed steroid packs without significant improvement.  She has never had advanced imaging, but does note that she had an x-ray completed with her chiropractor which showed some abnormal curvature although she does not remember what it was.  No upper extremity weakness.    She notes that she has been told with her recent emergency room  "visits that anxiety is likely a key component to her physical symptoms.  She notes a longstanding history of anxiety, dating back to her teenage years.  She was on anxiety meds through her 20's. Stopped over 5 years ago. She thinks she was on Zoloft - didn't feel like it made her feel better.  She does believe at this time that anxiety symptoms, regardless of the physical symptoms as described, are quite disruptive and she questions whether or not it was appropriate for her to stop her medications.  She does see a therapist quite frequently.         Objective    /87 (BP Location: Left arm, Patient Position: Sitting, Cuff Size: Adult Regular)   Pulse 92   Temp 98.4  F (36.9  C) (Oral)   Resp 12   Ht 1.702 m (5' 7\")   Wt 70.8 kg (156 lb 2 oz)   LMP 03/25/2024 (Approximate)   SpO2 97%   BMI 24.45 kg/m    Body mass index is 24.45 kg/m .    Physical Exam  Vitals and nursing note reviewed.   Constitutional:       Appearance: Normal appearance. She is not ill-appearing or toxic-appearing.   Eyes:      Pupils: Pupils are equal, round, and reactive to light.   Cardiovascular:      Rate and Rhythm: Normal rate and regular rhythm.   Pulmonary:      Effort: Pulmonary effort is normal. No respiratory distress.   Skin:     General: Skin is warm.      Coloration: Skin is not pale.   Neurological:      Mental Status: She is alert.   Psychiatric:         Attention and Perception: Attention normal.         Mood and Affect: Mood is anxious. Affect is tearful.         Speech: Speech normal.         Behavior: Behavior is not withdrawn or hyperactive. Behavior is cooperative.         Thought Content: Thought content normal.         Cognition and Memory: Cognition normal.              Signed Electronically by: NATALI Morse CNP    Answers submitted by the patient for this visit:  Patient Health Questionnaire (Submitted on 5/7/2024)  If you checked off any problems, how difficult have these problems made it for you " to do your work, take care of things at home, or get along with other people?: Very difficult  PHQ9 TOTAL SCORE: 8  LORETTA-7 (Submitted on 5/7/2024)  LORETTA 7 TOTAL SCORE: 14

## 2024-05-08 ENCOUNTER — MYC MEDICAL ADVICE (OUTPATIENT)
Dept: FAMILY MEDICINE | Facility: CLINIC | Age: 29
End: 2024-05-08

## 2024-05-08 DIAGNOSIS — M79.2 NEUROPATHIC PAIN: Primary | ICD-10-CM

## 2024-05-17 ENCOUNTER — OFFICE VISIT (OUTPATIENT)
Dept: PHYSICAL MEDICINE AND REHAB | Facility: CLINIC | Age: 29
End: 2024-05-17
Payer: COMMERCIAL

## 2024-05-17 VITALS
OXYGEN SATURATION: 96 % | HEART RATE: 109 BPM | DIASTOLIC BLOOD PRESSURE: 82 MMHG | BODY MASS INDEX: 23.93 KG/M2 | WEIGHT: 152.5 LBS | SYSTOLIC BLOOD PRESSURE: 129 MMHG | HEIGHT: 67 IN

## 2024-05-17 DIAGNOSIS — M54.2 PAIN OF CERVICAL SPINE: ICD-10-CM

## 2024-05-17 DIAGNOSIS — G44.86 CERVICOGENIC HEADACHE: ICD-10-CM

## 2024-05-17 DIAGNOSIS — M79.18 CERVICAL MYOFASCIAL PAIN SYNDROME: Primary | ICD-10-CM

## 2024-05-17 PROCEDURE — 99204 OFFICE O/P NEW MOD 45 MIN: CPT | Performed by: STUDENT IN AN ORGANIZED HEALTH CARE EDUCATION/TRAINING PROGRAM

## 2024-05-17 RX ORDER — SULFAMETHOXAZOLE/TRIMETHOPRIM 800-160 MG
160 TABLET ORAL
COMMUNITY
Start: 2024-05-15 | End: 2024-05-18

## 2024-05-17 ASSESSMENT — PAIN SCALES - GENERAL: PAINLEVEL: SEVERE PAIN (7)
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
## 2024-05-17 NOTE — PATIENT INSTRUCTIONS
~Spine Center Scheduling #[phone].  ~Please call our [facility] Spine Nurse Navigation #[phone] with any questions or concerns about your treatment plan, if symptoms worsen and you would like to be seen urgently, or if you have problems controlling bladder and bowel function.  ~For any future flareups or new symptoms, recommend follow-up in clinic or contact the nurse navigator line.  ~Please note that any My Chart messages may take multiple days for a response due to the high volume of patients seen in clinic.  Anything sent Friday night or after will be answered the following week when able.     We discussed diclofenac 1% gel, also known as Voltaren, today in clinic.  This is available over-the-counter, would recommend applying a small amount to the tender area of your right neck 2-3 times a day for 2 weeks, and then can move to as needed after that.    ~You have been referred for Physical Therapy to [facility]. They will call you to schedule an appointment.      Scheduling phone number is [phone] for [facility], or [place] location.  If you have not heard from the scheduling office within 2 business days, please call [phone] for ALL other locations.    Discussed the importance of core strengthening, ROM, stretching exercises and how each of these entities is important in decreasing pain and improving long term spine health.  The purpose of physical therapy is to teach you an individualized home exercise program.  These exercises need to be performed every day in order to decrease pain and prevent future occurrences of pain.

## 2024-05-30 PROCEDURE — 93244 EXT ECG>48HR<7D REV&INTERPJ: CPT | Performed by: INTERNAL MEDICINE

## 2024-06-01 ENCOUNTER — HOSPITAL ENCOUNTER (OUTPATIENT)
Dept: CT IMAGING | Facility: CLINIC | Age: 29
Discharge: HOME | End: 2024-06-01
Payer: COMMERCIAL

## 2024-06-01 DIAGNOSIS — N28.89 DILATED RENAL PELVIS: ICD-10-CM

## 2024-06-01 PROCEDURE — 74176 CT ABD & PELVIS W/O CONTRAST: CPT

## 2024-09-09 ENCOUNTER — HOSPITAL ENCOUNTER (EMERGENCY)
Facility: CLINIC | Age: 29
Discharge: HOME | End: 2024-09-09
Attending: EMERGENCY MEDICINE | Admitting: EMERGENCY MEDICINE
Payer: COMMERCIAL

## 2024-09-09 VITALS
HEART RATE: 125 BPM | RESPIRATION RATE: 18 BRPM | TEMPERATURE: 99.1 F | OXYGEN SATURATION: 95 % | DIASTOLIC BLOOD PRESSURE: 76 MMHG | SYSTOLIC BLOOD PRESSURE: 112 MMHG

## 2024-09-09 DIAGNOSIS — U07.1 COVID-19: ICD-10-CM

## 2024-09-09 DIAGNOSIS — M54.41 ACUTE BILATERAL LOW BACK PAIN WITH BILATERAL SCIATICA: ICD-10-CM

## 2024-09-09 DIAGNOSIS — R51.9 ACUTE NONINTRACTABLE HEADACHE, UNSPECIFIED HEADACHE TYPE: ICD-10-CM

## 2024-09-09 DIAGNOSIS — M54.42 ACUTE BILATERAL LOW BACK PAIN WITH BILATERAL SCIATICA: ICD-10-CM

## 2024-09-09 LAB
ALBUMIN SERPL BCG-MCNC: 4.2 G/DL (ref 3.5–5.2)
ALBUMIN UR-MCNC: NEGATIVE MG/DL
ALP SERPL-CCNC: 71 U/L (ref 40–150)
ALT SERPL W P-5'-P-CCNC: 25 U/L (ref 0–50)
ANION GAP SERPL CALCULATED.3IONS-SCNC: 9 MMOL/L (ref 7–15)
APPEARANCE UR: CLEAR
AST SERPL W P-5'-P-CCNC: 18 U/L (ref 0–45)
BASOPHILS # BLD AUTO: 0 10E3/UL (ref 0–0.2)
BASOPHILS NFR BLD AUTO: 0 %
BILIRUB SERPL-MCNC: 0.3 MG/DL
BILIRUB UR QL STRIP: NEGATIVE
BUN SERPL-MCNC: 11 MG/DL (ref 6–20)
CALCIUM SERPL-MCNC: 9.1 MG/DL (ref 8.8–10.4)
CHLORIDE SERPL-SCNC: 104 MMOL/L (ref 98–107)
COLOR UR AUTO: YELLOW
CREAT SERPL-MCNC: 0.73 MG/DL (ref 0.51–0.95)
CRP SERPL-MCNC: <3 MG/L
EGFRCR SERPLBLD CKD-EPI 2021: >90 ML/MIN/1.73M2
EOSINOPHIL # BLD AUTO: 0.1 10E3/UL (ref 0–0.7)
EOSINOPHIL NFR BLD AUTO: 3 %
ERYTHROCYTE [DISTWIDTH] IN BLOOD BY AUTOMATED COUNT: 11.7 % (ref 10–15)
GLUCOSE SERPL-MCNC: 112 MG/DL (ref 70–99)
GLUCOSE UR STRIP-MCNC: NEGATIVE MG/DL
HCG UR QL: NEGATIVE
HCO3 SERPL-SCNC: 24 MMOL/L (ref 22–29)
HCT VFR BLD AUTO: 38.2 % (ref 35–47)
HGB BLD-MCNC: 13.2 G/DL (ref 11.7–15.7)
HGB UR QL STRIP: NEGATIVE
IMM GRANULOCYTES # BLD: 0 10E3/UL
IMM GRANULOCYTES NFR BLD: 0 %
KETONES UR STRIP-MCNC: 5 MG/DL
LACTATE SERPL-SCNC: 0.6 MMOL/L (ref 0.7–2)
LEUKOCYTE ESTERASE UR QL STRIP: NEGATIVE
LYMPHOCYTES # BLD AUTO: 0.2 10E3/UL (ref 0.8–5.3)
LYMPHOCYTES NFR BLD AUTO: 5 %
MCH RBC QN AUTO: 32.2 PG (ref 26.5–33)
MCHC RBC AUTO-ENTMCNC: 34.6 G/DL (ref 31.5–36.5)
MCV RBC AUTO: 93 FL (ref 78–100)
MONOCYTES # BLD AUTO: 0.4 10E3/UL (ref 0–1.3)
MONOCYTES NFR BLD AUTO: 8 %
NEUTROPHILS # BLD AUTO: 4.2 10E3/UL (ref 1.6–8.3)
NEUTROPHILS NFR BLD AUTO: 83 %
NITRATE UR QL: NEGATIVE
NRBC # BLD AUTO: 0 10E3/UL
NRBC BLD AUTO-RTO: 0 /100
PH UR STRIP: 7 [PH] (ref 5–7)
PLATELET # BLD AUTO: 177 10E3/UL (ref 150–450)
POTASSIUM SERPL-SCNC: 4 MMOL/L (ref 3.4–5.3)
PROCALCITONIN SERPL IA-MCNC: 0.04 NG/ML
PROT SERPL-MCNC: 6.7 G/DL (ref 6.4–8.3)
RBC # BLD AUTO: 4.1 10E6/UL (ref 3.8–5.2)
SARS-COV-2 RNA RESP QL NAA+PROBE: POSITIVE
SODIUM SERPL-SCNC: 137 MMOL/L (ref 135–145)
SP GR UR STRIP: 1.02 (ref 1–1.03)
TSH SERPL DL<=0.005 MIU/L-ACNC: 0.43 UIU/ML (ref 0.3–4.2)
UROBILINOGEN UR STRIP-MCNC: NORMAL MG/DL
WBC # BLD AUTO: 5 10E3/UL (ref 4–11)

## 2024-09-09 PROCEDURE — 96375 TX/PRO/DX INJ NEW DRUG ADDON: CPT

## 2024-09-09 PROCEDURE — 83605 ASSAY OF LACTIC ACID: CPT | Performed by: EMERGENCY MEDICINE

## 2024-09-09 PROCEDURE — 84145 PROCALCITONIN (PCT): CPT | Performed by: EMERGENCY MEDICINE

## 2024-09-09 PROCEDURE — 258N000003 HC RX IP 258 OP 636: Performed by: EMERGENCY MEDICINE

## 2024-09-09 PROCEDURE — 99284 EMERGENCY DEPT VISIT MOD MDM: CPT | Mod: 25

## 2024-09-09 PROCEDURE — 99284 EMERGENCY DEPT VISIT MOD MDM: CPT | Performed by: EMERGENCY MEDICINE

## 2024-09-09 PROCEDURE — 86140 C-REACTIVE PROTEIN: CPT | Performed by: EMERGENCY MEDICINE

## 2024-09-09 PROCEDURE — 96361 HYDRATE IV INFUSION ADD-ON: CPT

## 2024-09-09 PROCEDURE — 87635 SARS-COV-2 COVID-19 AMP PRB: CPT | Performed by: EMERGENCY MEDICINE

## 2024-09-09 PROCEDURE — 81003 URINALYSIS AUTO W/O SCOPE: CPT | Performed by: EMERGENCY MEDICINE

## 2024-09-09 PROCEDURE — 36415 COLL VENOUS BLD VENIPUNCTURE: CPT | Performed by: EMERGENCY MEDICINE

## 2024-09-09 PROCEDURE — 81025 URINE PREGNANCY TEST: CPT | Performed by: EMERGENCY MEDICINE

## 2024-09-09 PROCEDURE — 80053 COMPREHEN METABOLIC PANEL: CPT | Performed by: EMERGENCY MEDICINE

## 2024-09-09 PROCEDURE — 250N000011 HC RX IP 250 OP 636: Performed by: EMERGENCY MEDICINE

## 2024-09-09 PROCEDURE — 96374 THER/PROPH/DIAG INJ IV PUSH: CPT

## 2024-09-09 PROCEDURE — 84443 ASSAY THYROID STIM HORMONE: CPT | Performed by: EMERGENCY MEDICINE

## 2024-09-09 PROCEDURE — 85025 COMPLETE CBC W/AUTO DIFF WBC: CPT | Performed by: EMERGENCY MEDICINE

## 2024-09-09 RX ORDER — OLANZAPINE 10 MG/1
2.5 INJECTION, POWDER, LYOPHILIZED, FOR SOLUTION INTRAMUSCULAR ONCE
Status: COMPLETED | OUTPATIENT
Start: 2024-09-09 | End: 2024-09-09

## 2024-09-09 RX ORDER — KETOROLAC TROMETHAMINE 15 MG/ML
15 INJECTION, SOLUTION INTRAMUSCULAR; INTRAVENOUS ONCE
Status: COMPLETED | OUTPATIENT
Start: 2024-09-09 | End: 2024-09-09

## 2024-09-09 RX ADMIN — KETOROLAC TROMETHAMINE 15 MG: 15 INJECTION, SOLUTION INTRAMUSCULAR; INTRAVENOUS at 05:21

## 2024-09-09 RX ADMIN — SODIUM CHLORIDE, POTASSIUM CHLORIDE, SODIUM LACTATE AND CALCIUM CHLORIDE 1000 ML: 600; 310; 30; 20 INJECTION, SOLUTION INTRAVENOUS at 04:42

## 2024-09-09 RX ADMIN — OLANZAPINE 2.5 MG: 10 INJECTION, POWDER, FOR SOLUTION INTRAMUSCULAR at 04:42

## 2024-09-09 ASSESSMENT — ACTIVITIES OF DAILY LIVING (ADL)
ADLS_ACUITY_SCORE: 35
ADLS_ACUITY_SCORE: 35

## 2024-09-09 NOTE — ED TRIAGE NOTES
Generalized pain all over body, sharp pain in lower left back, no feeling in lower legs tonight. Has known pineal gland cyst.   No loss of bowel or bladder function. Took tylenol PM at 0200

## 2024-09-09 NOTE — ED PROVIDER NOTES
History     Chief Complaint   Patient presents with    Generalized Weakness    Back Pain     HPI  Melissa Mcgowan is a 29 year old female who presents for headache and back pain and fever.  The patient reports that she has had ongoing issues over the past 2 years.  She reports ongoing headaches for 2 years that have been progressively worse.  This headache started last evening and is severe, has been getting worse over hours, frontal.  She is sensitive to light and she has had nausea and vomiting.  She reports a fever of 100.6  F at home.  She is taking acetaminophen for her symptoms.  She denies any cough or chest pain.  No abdominal pain or diarrhea.  She denies dysuria or urinary frequency.  She also reports terrible low back pain that has been getting worse for several weeks.  No falls or injuries.  Pain is located in the low back near her tailbone she says, she feels numb in both legs circumferentially all the way down from hips to toes.  No saddle anesthesia.  No incontinence of urine or stool.    I reviewed the patient's neurology visit from 8/13/2024, diagnosed with POTS as a cause of a lot of her symptoms as well as occipital neuralgia.  Outpatient MRI was ordered.  I reviewed the MRI of her brain and MRI of the cervical spine from 8/23/2026 as well as the MRI of the orbits and MRV of the brain from 9/5/2024.  No significant abnormality with exception of an incidentally detected pineal region cyst.        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 Provider to review and confirm.          Past Medical History:    Past Medical History:   Diagnosis Date    Allergies        Past Surgical History:    Past Surgical History:   Procedure Laterality Date    DENTAL SURGERY      wisdom teeth    MYRINGOTOMY, INSERT TUBE BILATERAL, COMBINED      TONSILLECTOMY         Family History:    No family history on file.    Social History:  Marital Status:   [2]  Social History     Tobacco Use    Smoking status: Never     Passive exposure: Never    Smokeless tobacco: Never   Vaping Use    Vaping status: Never Used   Substance Use Topics    Alcohol use: No    Drug use: No        Medications:    escitalopram (LEXAPRO) 10 MG tablet  LORazepam (ATIVAN) 1 MG tablet          Review of Systems    Physical Exam   BP: 122/81  Pulse: (!) 125  Temp: 99.1  F (37.3  C)  Resp: 18  SpO2: 96 %      Physical Exam    ED Course        Procedures              Critical Care time:  none               Results for orders placed or performed during the hospital encounter of 09/09/24 (from the past 24 hour(s))   Comprehensive metabolic panel   Result Value Ref Range    Sodium 137 135 - 145 mmol/L    Potassium 4.0 3.4 - 5.3 mmol/L    Carbon Dioxide (CO2) 24 22 - 29 mmol/L    Anion Gap 9 7 - 15 mmol/L    Urea Nitrogen 11.0 6.0 - 20.0 mg/dL    Creatinine 0.73 0.51 - 0.95 mg/dL    GFR Estimate >90 >60 mL/min/1.73m2    Calcium 9.1 8.8 - 10.4 mg/dL    Chloride 104 98 - 107 mmol/L    Glucose 112 (H) 70 - 99 mg/dL    Alkaline Phosphatase 71 40 - 150 U/L    AST 18 0 - 45 U/L    ALT 25 0 - 50 U/L    Protein Total 6.7 6.4 - 8.3 g/dL    Albumin 4.2 3.5 - 5.2 g/dL    Bilirubin Total 0.3 <=1.2 mg/dL   CBC with Platelets & Differential    Narrative    The following orders were created for panel order CBC with Platelets &  Differential.  Procedure                               Abnormality         Status                     ---------                               -----------         ------                     CBC with platelets and d...[[id number]]  Abnormal            Final result                 Please view results for these tests on the individual orders.   TSH with free T4 reflex   Result Value Ref Range    TSH 0.43 0.30 - 4.20 uIU/mL   HCG qualitative urine   Result Value Ref Range    hCG Urine Qualitative Negative Negative   Urine Macroscopic with reflex to Microscopic   Result Value Ref Range    Color Urine Yellow Colorless, Straw, Light Yellow, Yellow    Appearance Urine Clear Clear    Glucose Urine Negative Negative mg/dL    Bilirubin Urine Negative Negative    Ketones Urine 5 (A) Negative mg/dL    Specific Gravity Urine 1.020 1.003 - 1.035    Blood Urine Negative Negative    pH Urine 7.0 5.0 - 7.0    Protein Albumin Urine Negative Negative mg/dL    Urobilinogen Urine Normal Normal, 2.0 mg/dL    Nitrite Urine Negative Negative    Leukocyte Esterase Urine Negative Negative    Narrative    Microscopic not indicated   Lactic Acid Whole Blood with 1X Repeat in 2 HR when >2   Result Value Ref Range    Lactic Acid, Initial 0.6 (L) 0.7 - 2.0 mmol/L   Procalcitonin   Result Value Ref Range    Procalcitonin 0.04 <0.50 ng/mL   CRP Inflammation   Result Value Ref Range    CRP Inflammation <3.00 <5.00 mg/L   CBC with platelets and differential   Result Value Ref Range    WBC Count 5.0 4.0 - 11.0 10e3/uL    RBC Count 4.10 3.80 - 5.20 10e6/uL    Hemoglobin 13.2 11.7 - 15.7 g/dL    Hematocrit 38.2 35.0 - 47.0 %    MCV 93 78 - 100 fL    MCH 32.2 26.5 - 33.0 pg    MCHC 34.6 31.5 - 36.5 g/dL    RDW 11.7 10.0 - 15.0 %    Platelet Count 177 150 - 450 10e3/uL    % Neutrophils 83 %    % Lymphocytes 5 %    % Monocytes 8 %    % Eosinophils 3 %    % Basophils 0 %    % Immature Granulocytes 0 %    NRBCs per 100 WBC 0 <1 /100    Absolute Neutrophils 4.2  1.6 - 8.3 10e3/uL    Absolute Lymphocytes 0.2 (L) 0.8 - 5.3 10e3/uL    Absolute Monocytes 0.4 0.0 - 1.3 10e3/uL    Absolute Eosinophils 0.1 0.0 - 0.7 10e3/uL    Absolute Basophils 0.0 0.0 - 0.2 10e3/uL    Absolute Immature Granulocytes 0.0 <=0.4 10e3/uL    Absolute NRBCs 0.0 10e3/uL   Symptomatic COVID-19 Virus (Coronavirus) by PCR Nose    Specimen: Nose; Swab   Result Value Ref Range    SARS CoV2 PCR Positive (A) Negative    Narrative    Testing was performed using the Xpert Xpress SARS-CoV-2 Assay on the Cepheid Gene-Xpert Instrument Systems. Additional information about this assay can be found via the Test Directory. This [place] FDA cleared test should be ordered for the detection of SARS-CoV-2 in individuals with signs and symptoms of respiratory tract infection. This test is for in vitro diagnostic use under the [place] FDA for laboratories certified under CLIA to perform high complexity testing. A negative result does not rule out the presence of PCR inhibitors in the specimen or target RNA concentration below the limit of detection for the assay. The possibility of a false negative should be considered if the patient's recent exposure or clinical presentation suggests COVID-19. This test was validated by [facility]. These Laboratories are certified under the  Clinical Laboratory Improvement Amendments (CLIA) as qualified to perform high complexity testing.       Medications   lactated ringers BOLUS 1,000 mL (0 mLs Intravenous Stopped 9/9/24 0499)   OLANZapine (zyPREXA) IV injection 2.5 mg (2.5 mg Intravenous $Given 9/9/24 3800)   ketorolac (TORADOL) injection 15 mg (15 mg Intravenous $Given 9/9/24 6900)       Assessments & Plan (with Medical Decision Making)   29-year-old female presents for evaluation of headache, fevers, back pain.  She is tachycardic but has a normal blood pressure here.  She is given IV fluids and olanzapine for her symptoms as well as IV ketorolac.  White blood cell count is 5  and her hemoglobin is 13.2 which is reassuring.  CRP and procalcitonin are both very low.  Her COVID-19 is positive and this is likely the cause of her symptoms.  Urinalysis is negative for signs of infection.  Pregnancy test is negative.  TSH is normal, no signs of thyroid disease.  On recheck the patient is feeling better, resting comfortably, her headache and back pain are improving.  Her symptoms are likely exacerbated by her COVID19 and at this time she is safe to discharge with instructions to return if she has worsening of her symptoms or other concerns, otherwise follow-up in clinic.  The patient is in agreement with this plan.  I have reviewed the nursing notes.    I have reviewed the findings, diagnosis, plan and need for follow up with the patient.           Medical Decision Making  The patient's presentation was of moderate complexity (an acute illness with systemic symptoms).    The patient's evaluation involved:  review of external note(s) from 1 sources (see separate area of note for details)  review of 3+ test result(s) ordered prior to this encounter (see separate area of note for details)  ordering and/or review of 3+ test(s) in this encounter (see separate area of note for details)    The patient's management necessitated moderate risk (prescription drug management including medications given in the ED).        New Prescriptions    No medications on file       Final diagnoses:   COVID-19   Acute nonintractable headache, unspecified headache type   Acute bilateral low back pain with bilateral sciatica       9/9/2024   [facility] EMERGENCY DEPT       Jet Schmidt MD  09/09/24 6122

## 2024-09-09 NOTE — DISCHARGE INSTRUCTIONS
Drink plenty fluids and use acetaminophen and ibuprofen to help with your symptoms.  Get some rest.  Return for difficulty breathing, repeated vomiting, worsening symptoms, or other concerns.  Otherwise follow-up in clinic.

## 2024-12-05 ENCOUNTER — TRANSCRIBE ORDERS (OUTPATIENT)
Dept: OTHER | Age: 29
End: 2024-12-05

## 2024-12-05 DIAGNOSIS — R00.0 TACHYCARDIA: Primary | ICD-10-CM

## 2025-01-10 ENCOUNTER — OFFICE VISIT (OUTPATIENT)
Dept: OBGYN | Facility: CLINIC | Age: 30
End: 2025-01-10
Payer: COMMERCIAL

## 2025-01-10 VITALS
RESPIRATION RATE: 20 BRPM | BODY MASS INDEX: 25.53 KG/M2 | TEMPERATURE: 98.1 F | HEART RATE: 118 BPM | WEIGHT: 163 LBS | DIASTOLIC BLOOD PRESSURE: 91 MMHG | SYSTOLIC BLOOD PRESSURE: 143 MMHG

## 2025-01-10 DIAGNOSIS — R10.2 PELVIC PAIN IN FEMALE: Primary | ICD-10-CM

## 2025-01-10 DIAGNOSIS — Z11.3 SCREEN FOR STD (SEXUALLY TRANSMITTED DISEASE): ICD-10-CM

## 2025-01-10 DIAGNOSIS — Z11.3 SCREENING FOR STD (SEXUALLY TRANSMITTED DISEASE): ICD-10-CM

## 2025-01-10 LAB
ALBUMIN UR-MCNC: NEGATIVE MG/DL
APPEARANCE UR: CLEAR
BACTERIA #/AREA URNS HPF: ABNORMAL /HPF
BILIRUB UR QL STRIP: NEGATIVE
COLOR UR AUTO: YELLOW
GLUCOSE UR STRIP-MCNC: NEGATIVE MG/DL
HCG UR QL: NEGATIVE
HGB UR QL STRIP: NEGATIVE
INTERNAL QC OK POCT: NORMAL
KETONES UR STRIP-MCNC: 15 MG/DL
LEUKOCYTE ESTERASE UR QL STRIP: NEGATIVE
NITRATE UR QL: NEGATIVE
PH UR STRIP: 6 [PH] (ref 5–7)
POCT KIT EXPIRATION DATE: NORMAL
POCT KIT LOT NUMBER: NORMAL
RBC #/AREA URNS AUTO: ABNORMAL /HPF
SP GR UR STRIP: 1.01 (ref 1–1.03)
SQUAMOUS #/AREA URNS AUTO: ABNORMAL /LPF
UROBILINOGEN UR STRIP-ACNC: 0.2 E.U./DL
WBC #/AREA URNS AUTO: ABNORMAL /HPF

## 2025-01-10 PROCEDURE — 99203 OFFICE O/P NEW LOW 30 MIN: CPT | Performed by: OBSTETRICS & GYNECOLOGY

## 2025-01-10 PROCEDURE — G0145 SCR C/V CYTO,THINLAYER,RESCR: HCPCS | Performed by: OBSTETRICS & GYNECOLOGY

## 2025-01-10 PROCEDURE — 87491 CHLMYD TRACH DNA AMP PROBE: CPT | Performed by: OBSTETRICS & GYNECOLOGY

## 2025-01-10 PROCEDURE — 81001 URINALYSIS AUTO W/SCOPE: CPT | Performed by: OBSTETRICS & GYNECOLOGY

## 2025-01-10 PROCEDURE — 87086 URINE CULTURE/COLONY COUNT: CPT | Performed by: OBSTETRICS & GYNECOLOGY

## 2025-01-10 PROCEDURE — 87624 HPV HI-RISK TYP POOLED RSLT: CPT | Performed by: OBSTETRICS & GYNECOLOGY

## 2025-01-10 PROCEDURE — 87591 N.GONORRHOEAE DNA AMP PROB: CPT | Performed by: OBSTETRICS & GYNECOLOGY

## 2025-01-10 PROCEDURE — 81025 URINE PREGNANCY TEST: CPT | Performed by: OBSTETRICS & GYNECOLOGY

## 2025-01-10 RX ORDER — MECLIZINE HYDROCHLORIDE 25 MG/1
25 TABLET ORAL
COMMUNITY
Start: 2024-07-11

## 2025-01-10 RX ORDER — METOPROLOL SUCCINATE 25 MG/1
0.5 TABLET, EXTENDED RELEASE ORAL DAILY
COMMUNITY
Start: 2024-12-04 | End: 2025-12-04

## 2025-01-10 RX ORDER — ONDANSETRON 4 MG/1
1 TABLET, ORALLY DISINTEGRATING ORAL EVERY 8 HOURS PRN
COMMUNITY
Start: 2024-09-23

## 2025-01-10 NOTE — PROGRESS NOTES
"[facility]  OB/GYN Clinic   Gynecology Consult Note    CC:     Chief Complaint   Patient presents with    Consult     Left sided vaginal/lower abdominal pain. Pt seen in ED had ultra sound done no findings. Not up to date on female check ups.        HPI: Melissa is a 29 year old G0 being seen for GYN consultation for left pelvic pain.    - This past year has been complicated health-wise: POTS, heart rate issues. Therapist, , sister are good supports  - Sunday night was laying on the couch, had sharp feeling on left sided pelvis - stabbing pain. Worsened to 9.5/10. Nauseous. In a line just superior to crease of hip.   - Went to urgent care [facility] on Tuesday 1/7, did ultrasound \"clear\" but no blood work or urine.  - Since then, has transformed to dull, but occasional stabbing. \"Almost feels like there's something in my uterus.\"  - Worse if sitting down, or moving leg. Heat helps a bit - masks it. Ibuprofen, tylenol doesn't help.   - Doesn't radiate.   - LMP 12/17, last sex mid-late December  - Hasn't really had pelvic pain before this besides periods. In the past has had to miss work or school because of period pain. Also every couple months has shock-like pain just for a second in vagina  - Chronic constipation, extra bad over last couple weeks. Previously 1x/day, but over the last couple weeks, small bowel movements every other day.  - Last PAP was in college, was normal    GYN Hx: Reports menarche at age 13, regular menses every 26-27 days, lasting 4 days. Denies spotting in between periods, are super crampy, heavy, painful. Denies any hx of STI or PID but hasn't been tested since college. Currently sexually active with male partner. Currently using nothing for contraception - got off birth control in May to see if it would help for POTS. Has used pill. Denies any vaginal discharge, vulvar itching/burning or pain. Denies any dyspareunia or pelvic pain with defecation or urination, " does have chronic constipation. Does get lots of facial hair that she has to pluck or shave, doesn't have facial/body acne.     Sister has PCOS, cervical polyps, endometriosis, PMDD, pelvic floor dysfunction, ectopic pregnancy.    PMH:   Past Medical History:   Diagnosis Date    Allergies     takes zyrtec       PSHx:   Past Surgical History:   Procedure Laterality Date    DENTAL SURGERY      wisdom teeth    MYRINGOTOMY, INSERT TUBE BILATERAL, COMBINED      TONSILLECTOMY         OBHx:   OB History   No obstetric history on file.       Medications:   Current Outpatient Medications   Medication Sig Dispense Refill    escitalopram (LEXAPRO) 10 MG tablet Take 1 tablet (10 mg) by mouth daily 90 tablet 3    LORazepam (ATIVAN) 1 MG tablet Take 0.5-1 tablets (0.5-1 mg) by mouth every 6 hours as needed for anxiety 10 tablet 0    meclizine (ANTIVERT) 25 MG tablet Take 25 mg by mouth.      ondansetron (ZOFRAN ODT) 4 MG ODT tab Take 1 tablet by mouth every 8 hours as needed.      metoprolol succinate ER (TOPROL XL) 25 MG 24 hr tablet Take 0.5 tablets by mouth daily. (Patient not taking: Reported on 1/10/2025)       No current facility-administered medications for this visit.       Allergies:      Allergies   Allergen Reactions    Seasonal Allergies     Wheat Germ Oil Unknown       Social History:   Social History     Socioeconomic History    Marital status:      Spouse name: Not on file    Number of children: Not on file    Years of education: Not on file    Highest education level: Not on file   Occupational History    Not on file   Tobacco Use    Smoking status: Never     Passive exposure: Never    Smokeless tobacco: Never   Vaping Use    Vaping status: Never Used   Substance and Sexual Activity    Alcohol use: No    Drug use: No    Sexual activity: Never   Other Topics Concern    Parent/sibling w/ CABG, MI or angioplasty before 65F 55M? No   Social History Narrative    Not on file     Social Drivers of Health      Financial Resource Strain: High Risk (5/7/2024)    Financial Resource Strain     Within the past 12 months, have you or your family members you live with been unable to get utilities (heat, electricity) when it was really needed?: Yes   Food Insecurity: Low Risk  (5/7/2024)    Food Insecurity     Within the past 12 months, did you worry that your food would run out before you got money to buy more?: No     Within the past 12 months, did the food you bought just not last and you didn t have money to get more?: No   Transportation Needs: Low Risk  (5/7/2024)    Transportation Needs     Within the past 12 months, has lack of transportation kept you from medical appointments, getting your medicines, non-medical meetings or appointments, work, or from getting things that you need?: No   Physical Activity: Not on file   Stress: Not on file   Social Connections: Unknown (7/28/2022)    Received from [facility]    Social Connections     Frequency of Communication with Friends and Family: Not on file   Interpersonal Safety: Low Risk  (5/7/2024)    Interpersonal Safety     Do you feel physically and emotionally safe where you currently live?: Yes     Within the past 12 months, have you been hit, slapped, kicked or otherwise physically hurt by someone?: No     Within the past 12 months, have you been humiliated or emotionally abused in other ways by your partner or ex-partner?: No   Housing Stability: Low Risk  (5/7/2024)    Housing Stability     Do you have housing? : Yes     Are you worried about losing your housing?: No       Family History:   No family history on file.    Physical Exam:   Vitals:    01/10/25 1450   BP: (!) 143/91   BP Location: Right arm   Patient Position: Sitting   Cuff Size: Adult Regular   Pulse: 118   Resp: 20   Temp: 98.1  F (36.7  C)   TempSrc: Tympanic   Weight: 73.9 kg (163 lb)      Estimated body mass index is  "25.53 kg/m  as calculated from the following:    Height as of 5/17/24: 1.702 m (5' 7\").    Weight as of this encounter: 73.9 kg (163 lb).    Gen: Pleasant female not ill-appearing  Respiratory: breathing comfortably on room air   Cardiac: Regular rate, warm and well-perfused.   GI: Abd soft and non-tender  : External genitalia is free of lesion. Urethra and bartholin glands normal.  Vaginal mucosa is moist and pink without unusual discharge.  Cervix is without lesions or discharge.  Pap smear obtained without incident.  Bimanual exam reveals mobile uterus without cervical motion tenderness.  Adenexa are mobile and non-tender bilaterally. No appreciable adnexal enlargement. No tenderness over the bladder. Perineum intact.  Rectal: no masses or hemorrhoids visually appreciated  Derm: no acanthosis nigricans, ance or facial/back/abdominal hair growth patterns   MSK: Grossly normal movement of all four extremities  Psych: mood and affect appropriate  Lower extremity: edema not present     A&P: Melissa is a 29 year old G0 being seen for GYN consultation for left pelvic pain.  Collected UA/Ucx, GC/Chlam. Normal pelvic exam.   Based on timing and description of symptoms, likely ruptured follicle that bled and irritated the peritoneum/aka mittelschmirtz pain. Differential includes endometriosis, discussed medication options including Mirena IUD, POP, nexplanon, depo, and Orilissa; and Melissa will consider. Possible component of chronic constipation, encouraged daily miralax. Could consider pelvic floor therapy if pain does not resolve.  Plan to watch and monitor symptoms for now and RTC if persistent.     Emily Arias, MS3  [employer]      I agree with the medical student's documentation above and have edited it for accuracy. I was present for and performed the physical exam and interview of the patient myself. All medical decision-making was performed by me. If a procedure was performed, that " was performed by me. Additionally, if billing is based on time, I am only using the time that I personally spent with the patient in my time statement.     Marietta Villarreal MD  OB/GYN

## 2025-01-11 LAB
C TRACH DNA SPEC QL PROBE+SIG AMP: NEGATIVE
N GONORRHOEA DNA SPEC QL NAA+PROBE: NEGATIVE
SPECIMEN TYPE: NORMAL

## 2025-01-12 LAB — BACTERIA UR CULT: NORMAL

## 2025-01-13 LAB
HPV HR 12 DNA CVX QL NAA+PROBE: NEGATIVE
HPV16 DNA CVX QL NAA+PROBE: NEGATIVE
HPV18 DNA CVX QL NAA+PROBE: NEGATIVE
HUMAN PAPILLOMA VIRUS FINAL DIAGNOSIS: NORMAL

## 2025-01-15 LAB
BKR AP ASSOCIATED HPV REPORT: NORMAL
BKR LAB AP GYN ADEQUACY: NORMAL
BKR LAB AP GYN INTERPRETATION: NORMAL
BKR LAB AP PREVIOUS ABNORMAL: NORMAL
PATH REPORT.COMMENTS IMP SPEC: NORMAL
PATH REPORT.COMMENTS IMP SPEC: NORMAL
PATH REPORT.RELEVANT HX SPEC: NORMAL

## 2025-02-04 ENCOUNTER — HOSPITAL ENCOUNTER (EMERGENCY)
Facility: CLINIC | Age: 30
Discharge: HOME | End: 2025-02-04
Attending: FAMILY MEDICINE | Admitting: FAMILY MEDICINE
Payer: COMMERCIAL

## 2025-02-04 VITALS
HEART RATE: 102 BPM | RESPIRATION RATE: 16 BRPM | WEIGHT: 150 LBS | TEMPERATURE: 98.1 F | DIASTOLIC BLOOD PRESSURE: 82 MMHG | BODY MASS INDEX: 22.73 KG/M2 | HEIGHT: 68 IN | OXYGEN SATURATION: 100 % | SYSTOLIC BLOOD PRESSURE: 122 MMHG

## 2025-02-04 DIAGNOSIS — R11.0 NAUSEA: ICD-10-CM

## 2025-02-04 LAB
ALBUMIN SERPL BCG-MCNC: 4.3 G/DL (ref 3.5–5.2)
ALP SERPL-CCNC: 73 U/L (ref 40–150)
ALT SERPL W P-5'-P-CCNC: 17 U/L (ref 0–50)
ANION GAP SERPL CALCULATED.3IONS-SCNC: 9 MMOL/L (ref 7–15)
AST SERPL W P-5'-P-CCNC: 13 U/L (ref 0–45)
BASOPHILS # BLD AUTO: 0 10E3/UL (ref 0–0.2)
BASOPHILS NFR BLD AUTO: 1 %
BILIRUB SERPL-MCNC: 0.3 MG/DL
BUN SERPL-MCNC: 14.9 MG/DL (ref 6–20)
CALCIUM SERPL-MCNC: 9.4 MG/DL (ref 8.8–10.4)
CHLORIDE SERPL-SCNC: 104 MMOL/L (ref 98–107)
CREAT SERPL-MCNC: 0.71 MG/DL (ref 0.51–0.95)
EGFRCR SERPLBLD CKD-EPI 2021: >90 ML/MIN/1.73M2
EOSINOPHIL # BLD AUTO: 0.1 10E3/UL (ref 0–0.7)
EOSINOPHIL NFR BLD AUTO: 2 %
ERYTHROCYTE [DISTWIDTH] IN BLOOD BY AUTOMATED COUNT: 11.9 % (ref 10–15)
GLUCOSE SERPL-MCNC: 101 MG/DL (ref 70–99)
HCG UR QL: NEGATIVE
HCO3 SERPL-SCNC: 26 MMOL/L (ref 22–29)
HCT VFR BLD AUTO: 43 % (ref 35–47)
HGB BLD-MCNC: 14.6 G/DL (ref 11.7–15.7)
IMM GRANULOCYTES # BLD: 0 10E3/UL
IMM GRANULOCYTES NFR BLD: 0 %
LIPASE SERPL-CCNC: 34 U/L (ref 13–60)
LYMPHOCYTES # BLD AUTO: 2.3 10E3/UL (ref 0.8–5.3)
LYMPHOCYTES NFR BLD AUTO: 33 %
MCH RBC QN AUTO: 31.1 PG (ref 26.5–33)
MCHC RBC AUTO-ENTMCNC: 34 G/DL (ref 31.5–36.5)
MCV RBC AUTO: 92 FL (ref 78–100)
MONOCYTES # BLD AUTO: 0.4 10E3/UL (ref 0–1.3)
MONOCYTES NFR BLD AUTO: 6 %
NEUTROPHILS # BLD AUTO: 4 10E3/UL (ref 1.6–8.3)
NEUTROPHILS NFR BLD AUTO: 58 %
NRBC # BLD AUTO: 0 10E3/UL
NRBC BLD AUTO-RTO: 0 /100
PLATELET # BLD AUTO: 244 10E3/UL (ref 150–450)
POTASSIUM SERPL-SCNC: 3.9 MMOL/L (ref 3.4–5.3)
PROT SERPL-MCNC: 6.9 G/DL (ref 6.4–8.3)
RBC # BLD AUTO: 4.69 10E6/UL (ref 3.8–5.2)
SODIUM SERPL-SCNC: 139 MMOL/L (ref 135–145)
WBC # BLD AUTO: 6.9 10E3/UL (ref 4–11)

## 2025-02-04 PROCEDURE — 83690 ASSAY OF LIPASE: CPT | Performed by: FAMILY MEDICINE

## 2025-02-04 PROCEDURE — 85041 AUTOMATED RBC COUNT: CPT | Performed by: FAMILY MEDICINE

## 2025-02-04 PROCEDURE — 82040 ASSAY OF SERUM ALBUMIN: CPT | Performed by: FAMILY MEDICINE

## 2025-02-04 PROCEDURE — 99284 EMERGENCY DEPT VISIT MOD MDM: CPT | Performed by: FAMILY MEDICINE

## 2025-02-04 PROCEDURE — 85018 HEMOGLOBIN: CPT | Performed by: FAMILY MEDICINE

## 2025-02-04 PROCEDURE — 81025 URINE PREGNANCY TEST: CPT | Performed by: FAMILY MEDICINE

## 2025-02-04 PROCEDURE — 36415 COLL VENOUS BLD VENIPUNCTURE: CPT | Performed by: FAMILY MEDICINE

## 2025-02-04 PROCEDURE — 85004 AUTOMATED DIFF WBC COUNT: CPT | Performed by: FAMILY MEDICINE

## 2025-02-04 PROCEDURE — 99283 EMERGENCY DEPT VISIT LOW MDM: CPT

## 2025-02-04 PROCEDURE — 250N000011 HC RX IP 250 OP 636: Performed by: FAMILY MEDICINE

## 2025-02-04 RX ORDER — ONDANSETRON 4 MG/1
4 TABLET, ORALLY DISINTEGRATING ORAL ONCE
Status: COMPLETED | OUTPATIENT
Start: 2025-02-04 | End: 2025-02-04

## 2025-02-04 RX ORDER — ONDANSETRON 4 MG/1
4-8 TABLET, ORALLY DISINTEGRATING ORAL EVERY 6 HOURS PRN
Qty: 12 TABLET | Refills: 0 | Status: SHIPPED | OUTPATIENT
Start: 2025-02-04

## 2025-02-04 RX ADMIN — ONDANSETRON 4 MG: 4 TABLET, ORALLY DISINTEGRATING ORAL at 15:34

## 2025-02-04 ASSESSMENT — ACTIVITIES OF DAILY LIVING (ADL)
ADLS_ACUITY_SCORE: 41

## 2025-02-04 ASSESSMENT — COLUMBIA-SUICIDE SEVERITY RATING SCALE - C-SSRS
6. HAVE YOU EVER DONE ANYTHING, STARTED TO DO ANYTHING, OR PREPARED TO DO ANYTHING TO END YOUR LIFE?: NO
1. IN THE PAST MONTH, HAVE YOU WISHED YOU WERE DEAD OR WISHED YOU COULD GO TO SLEEP AND NOT WAKE UP?: NO
2. HAVE YOU ACTUALLY HAD ANY THOUGHTS OF KILLING YOURSELF IN THE PAST MONTH?: NO

## 2025-02-04 NOTE — DISCHARGE INSTRUCTIONS
The cause of your nausea is uncertain but your blood testing today is all normal.  It could be a side effect of the steroid injection in your back.  If the symptoms persist talk to your primary care doctor about next steps in evaluation.  You can use Zofran 4-8 mg up to every 6 hours if needed for nausea.

## 2025-02-04 NOTE — ED PROVIDER NOTES
History     Chief Complaint   Patient presents with    Nausea    Dizziness     HPI    Melissa Mcgowan is a 29 year old female who comes in from home with her  with nausea and dizziness and lower back pain.  She had a steroid injection 8 days ago in her lumbar spine at [facility] orthopedics for low back pain.  Since then she has had increased discomfort in the lower back wrapping around her abdomen.  She was seen by them for recheck last week and told that things looked good and there was no complication.  They were not concerned about infection.  However her symptoms have continued and she is concerned that there is been a complication from the injection.  Her primary symptom is nausea.  She has had problems with nausea and dizziness in the past that is been evaluated in neurology, cardiology, endocrinology as noted below.  She has not had any fevers.  She is not having any worsening weakness.  She has had some trouble with constipation but no fecal urinary incontinence or retention.  She was given a diagnosis of POTS and was offered a trial of metoprolol but has elected not yet to take it.    Records reviewed:  1.  Echocardiogram January 23, 2025.  The study was entirely normal.  2.  OB/GYN visit January 10, 2025 for pelvic pain felt to be mittelschmerz.  3.  Endocrinology visit January 7, 2025 at [facility] for elevated luteinizing hormone and low level of DHEA.  4.  Cardiology visit [facility] 12/18/2024.  Metoprolol 12.5 mg was initiated for tachycardia.  5.  Neurology clinic visit [facility] 12/17/2024 for dizziness.        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 Provider to review and confirm.          Past Medical History:    Past Medical History:   Diagnosis Date    Allergies        Past Surgical History:    Past Surgical History:   Procedure Laterality Date    DENTAL SURGERY      wisdom teeth    MYRINGOTOMY, INSERT TUBE BILATERAL, COMBINED      TONSILLECTOMY         Family History:    No family history on file.    Social History:  Marital Status:   [2]  Social History     Tobacco Use    Smoking status: Never     Passive exposure: Never    Smokeless tobacco: Never   Vaping Use    Vaping status: Never Used   Substance Use Topics    Alcohol use: No    Drug use: No        Medications:    ondansetron (ZOFRAN ODT) 4 MG ODT tab  escitalopram (LEXAPRO) 10 MG tablet  LORazepam (ATIVAN) 1 MG tablet  meclizine (ANTIVERT) 25 MG tablet  metoprolol succinate ER (TOPROL XL) 25 MG 24 hr tablet  ondansetron (ZOFRAN ODT) 4 MG ODT tab      Review of Systems    All other systems are reviewed and are negative    Physical Exam   BP: 122/82  Pulse: 102  Temp: 98.1  F (36.7  C)  Resp: 16  Height: 172.7 cm (5' 8\")  Weight: 68 kg (150 lb)  SpO2: 100 %      Physical Exam    Nursing note and vitals were reviewed.  Constitutional: Awake and alert, adequately nourished and developed appearing 29-year-old in no apparent discomfort, who does not appear acutely ill, and who answers questions appropriately and cooperates with examination.  HEENT: Speech is fluent. Voice quality is normal.   Pulmonary/Chest: Breathing is unlabored.    Musculoskeletal: Back " examination in the standing position reveals range of motion on flexion limited to about 60 degrees causing more nausea than pain.  Inspection of the injection site in the lower back reveals that it is not visible and there are no areas of erythema or ecchymosis swelling or localized tenderness  Neurological: Alert, oriented, thought content logical, coherent.  Motor strength intact in the lower extremities on manual muscle testing.  Skin: Warm, dry, no rashes.  Psychiatric: Affect anxious but not agitated.     ED Course        Procedures              Critical Care time:  none              Results for orders placed or performed during the hospital encounter of 02/04/25 (from the past 24 hours)   CBC with platelets differential    Narrative    The following orders were created for panel order CBC with platelets differential.  Procedure                               Abnormality         Status                     ---------                               -----------         ------                     CBC with platelets and d...[[id number]]                      Final result                 Please view results for these tests on the individual orders.   Comprehensive metabolic panel   Result Value Ref Range    Sodium 139 135 - 145 mmol/L    Potassium 3.9 3.4 - 5.3 mmol/L    Carbon Dioxide (CO2) 26 22 - 29 mmol/L    Anion Gap 9 7 - 15 mmol/L    Urea Nitrogen 14.9 6.0 - 20.0 mg/dL    Creatinine 0.71 0.51 - 0.95 mg/dL    GFR Estimate >90 >60 mL/min/1.73m2    Calcium 9.4 8.8 - 10.4 mg/dL    Chloride 104 98 - 107 mmol/L    Glucose 101 (H) 70 - 99 mg/dL    Alkaline Phosphatase 73 40 - 150 U/L    AST 13 0 - 45 U/L    ALT 17 0 - 50 U/L    Protein Total 6.9 6.4 - 8.3 g/dL    Albumin 4.3 3.5 - 5.2 g/dL    Bilirubin Total 0.3 <=1.2 mg/dL   Lipase   Result Value Ref Range    Lipase 34 13 - 60 U/L   CBC with platelets and differential   Result Value Ref Range    WBC Count 6.9 4.0 - 11.0 10e3/uL    RBC Count 4.69 3.80 - 5.20 10e6/uL     Hemoglobin 14.6 11.7 - 15.7 g/dL    Hematocrit 43.0 35.0 - 47.0 %    MCV 92 78 - 100 fL    MCH 31.1 26.5 - 33.0 pg    MCHC 34.0 31.5 - 36.5 g/dL    RDW 11.9 10.0 - 15.0 %    Platelet Count 244 150 - 450 10e3/uL    % Neutrophils 58 %    % Lymphocytes 33 %    % Monocytes 6 %    % Eosinophils 2 %    % Basophils 1 %    % Immature Granulocytes 0 %    NRBCs per 100 WBC 0 <1 /100    Absolute Neutrophils 4.0 1.6 - 8.3 10e3/uL    Absolute Lymphocytes 2.3 0.8 - 5.3 10e3/uL    Absolute Monocytes 0.4 0.0 - 1.3 10e3/uL    Absolute Eosinophils 0.1 0.0 - 0.7 10e3/uL    Absolute Basophils 0.0 0.0 - 0.2 10e3/uL    Absolute Immature Granulocytes 0.0 <=0.4 10e3/uL    Absolute NRBCs 0.0 10e3/uL   HCG qualitative urine   Result Value Ref Range    hCG Urine Qualitative Negative Negative       Medications   ondansetron (ZOFRAN ODT) ODT tab 4 mg (4 mg Oral $Given 2/4/25 6605)       Assessments & Plan (with Medical Decision Making)     29-year-old female presented with 8 days of nausea after corticosteroid injection in the low back for low back pain.  She has no signs or symptoms of a complication of the injection in terms of infection or bleeding.  We discussed that it is possible that the steroid could be causing nausea.  However this is a nonspecific symptom and the cause of it is uncertain.  Workup was undertaken to look for a worrisome cause and she has an entirely benign abdominal examination and completely normal laboratory testing including a CBC, liver enzymes, lipase, pregnancy testing.  Renal function is normal.  I do not see uremia, pancreatitis, hepatitis as a cause for her symptoms.  I do not have any concern for an infection in the area of the injection given these symptoms and reassuring examination.  She did not get much relief with Zofran for her nausea but does not want to try anything else.  She would like to continue to use Zofran.  She has an appointment scheduled tomorrow with her primary physician to discuss next  steps in the evaluation.    I have reviewed the nursing notes.    I have reviewed the findings, diagnosis, plan and need for follow up with the patient.         New Prescriptions    ONDANSETRON (ZOFRAN ODT) 4 MG ODT TAB    Take 1-2 tablets (4-8 mg) by mouth every 6 hours as needed for nausea.       Final diagnoses:   Nausea       2/4/2025   [facility] EMERGENCY DEPT       Bubba Rehman MD  02/04/25 [id number]

## 2025-02-04 NOTE — ED TRIAGE NOTES
Pt presents with dizziness, nausea, headache, and left lower back pain rated 9/10 since she had a steroid injection on 1/27. Reports tightness that wraps around her waist that creates nausea.      Triage Assessment (Adult)       Row Name 02/04/25 1341          Triage Assessment    Airway WDL WDL        Respiratory WDL    Respiratory WDL WDL        Skin Circulation/Temperature WDL    Skin Circulation/Temperature WDL WDL        Cardiac WDL    Cardiac WDL WDL        Peripheral/Neurovascular WDL    Peripheral Neurovascular WDL WDL        Cognitive/Neuro/Behavioral WDL    Cognitive/Neuro/Behavioral WDL WDL

## 2025-04-01 ENCOUNTER — TELEPHONE (OUTPATIENT)
Dept: CARDIOLOGY | Facility: CLINIC | Age: 30
End: 2025-04-01
Payer: COMMERCIAL

## 2025-04-01 NOTE — TELEPHONE ENCOUNTER
Left Voicemail (1st Attempt) for the patient to call back and schedule the following:    Appointment type:  new pots   Provider: alysia   Return date: 07/22/25  Specialty phone number: [phone] opt 1   Additional appointment(s) needed: n/a   Additonal Notes: please reschedule to next available

## 2025-04-01 NOTE — TELEPHONE ENCOUNTER
[facility] Call Center    Phone Message    May a detailed message be left on voicemail: yes     Reason for Call: Other: Pt called in regards to her appt that needs to be rescheduled. There's no other appt dates available for provider and pt has already waited 8 months for this appt and stresses she can't wait much longer as her symptoms continue to worsen. Please call pt back to discuss and schedule if able     Action Taken: Other: [facility]    Travel Screening: Not Applicable     Date of Service:

## 2025-04-03 NOTE — TELEPHONE ENCOUNTER
Patient Contacted for the patient to call back and schedule the following:    Appointment type:  new pots   Provider: alysia   Return date: 10/21/25  Specialty phone number: [phone] opt 1   Additional appointment(s) needed: n/a   Additonal Notes: please reschedule to next available

## 2025-05-11 ENCOUNTER — HEALTH MAINTENANCE LETTER (OUTPATIENT)
Age: 30
End: 2025-05-11

## 2025-06-23 ENCOUNTER — TRANSFERRED RECORDS (OUTPATIENT)
Dept: HEALTH INFORMATION MANAGEMENT | Facility: CLINIC | Age: 30
End: 2025-06-23
Payer: COMMERCIAL

## 2025-10-21 ENCOUNTER — PRE VISIT (OUTPATIENT)
Dept: CARDIOLOGY | Facility: CLINIC | Age: 30
End: 2025-10-21
Payer: COMMERCIAL